# Patient Record
Sex: FEMALE | Race: WHITE | NOT HISPANIC OR LATINO
[De-identification: names, ages, dates, MRNs, and addresses within clinical notes are randomized per-mention and may not be internally consistent; named-entity substitution may affect disease eponyms.]

---

## 2018-06-05 PROBLEM — Z00.00 ENCOUNTER FOR PREVENTIVE HEALTH EXAMINATION: Status: ACTIVE | Noted: 2018-06-05

## 2018-06-18 ENCOUNTER — APPOINTMENT (OUTPATIENT)
Dept: HEART AND VASCULAR | Facility: CLINIC | Age: 68
End: 2018-06-18
Payer: MEDICARE

## 2018-06-18 VITALS
RESPIRATION RATE: 14 BRPM | SYSTOLIC BLOOD PRESSURE: 123 MMHG | HEIGHT: 66 IN | OXYGEN SATURATION: 98 % | DIASTOLIC BLOOD PRESSURE: 80 MMHG | BODY MASS INDEX: 23.14 KG/M2 | WEIGHT: 144 LBS | TEMPERATURE: 97.8 F | HEART RATE: 65 BPM

## 2018-06-18 DIAGNOSIS — Z82.49 FAMILY HISTORY OF ISCHEMIC HEART DISEASE AND OTHER DISEASES OF THE CIRCULATORY SYSTEM: ICD-10-CM

## 2018-06-18 DIAGNOSIS — Z80.0 FAMILY HISTORY OF MALIGNANT NEOPLASM OF DIGESTIVE ORGANS: ICD-10-CM

## 2018-06-18 DIAGNOSIS — Z86.39 PERSONAL HISTORY OF OTHER ENDOCRINE, NUTRITIONAL AND METABOLIC DISEASE: ICD-10-CM

## 2018-06-18 DIAGNOSIS — Z80.3 FAMILY HISTORY OF MALIGNANT NEOPLASM OF BREAST: ICD-10-CM

## 2018-06-18 PROCEDURE — 99204 OFFICE O/P NEW MOD 45 MIN: CPT | Mod: 25

## 2018-06-18 PROCEDURE — 93306 TTE W/DOPPLER COMPLETE: CPT | Mod: XE

## 2018-06-18 PROCEDURE — 93000 ELECTROCARDIOGRAM COMPLETE: CPT

## 2018-09-18 ENCOUNTER — APPOINTMENT (OUTPATIENT)
Dept: HEART AND VASCULAR | Facility: CLINIC | Age: 68
End: 2018-09-18
Payer: MEDICARE

## 2018-09-18 VITALS
HEART RATE: 70 BPM | SYSTOLIC BLOOD PRESSURE: 125 MMHG | WEIGHT: 144 LBS | BODY MASS INDEX: 23.99 KG/M2 | DIASTOLIC BLOOD PRESSURE: 78 MMHG | RESPIRATION RATE: 14 BRPM | TEMPERATURE: 97.7 F | HEIGHT: 65 IN | OXYGEN SATURATION: 97 %

## 2018-09-18 PROCEDURE — 93224 XTRNL ECG REC UP TO 48 HRS: CPT

## 2018-09-18 PROCEDURE — 93000 ELECTROCARDIOGRAM COMPLETE: CPT | Mod: 59

## 2018-09-18 PROCEDURE — 99214 OFFICE O/P EST MOD 30 MIN: CPT

## 2019-03-01 ENCOUNTER — APPOINTMENT (OUTPATIENT)
Dept: HEART AND VASCULAR | Facility: CLINIC | Age: 69
End: 2019-03-01
Payer: MEDICARE

## 2019-03-01 VITALS
DIASTOLIC BLOOD PRESSURE: 80 MMHG | BODY MASS INDEX: 23.32 KG/M2 | SYSTOLIC BLOOD PRESSURE: 120 MMHG | HEIGHT: 65 IN | HEART RATE: 67 BPM | OXYGEN SATURATION: 100 % | WEIGHT: 140 LBS | RESPIRATION RATE: 14 BRPM

## 2019-03-01 PROCEDURE — 93306 TTE W/DOPPLER COMPLETE: CPT

## 2019-03-01 PROCEDURE — 93000 ELECTROCARDIOGRAM COMPLETE: CPT

## 2019-03-01 PROCEDURE — 99214 OFFICE O/P EST MOD 30 MIN: CPT

## 2019-03-01 NOTE — PHYSICAL EXAM
[General Appearance - Well Developed] : well developed [Normal Appearance] : normal appearance [Well Groomed] : well groomed [General Appearance - Well Nourished] : well nourished [No Deformities] : no deformities [General Appearance - In No Acute Distress] : no acute distress [Normal Conjunctiva] : the conjunctiva exhibited no abnormalities [Eyelids - No Xanthelasma] : the eyelids demonstrated no xanthelasmas [Normal Oral Mucosa] : normal oral mucosa [No Oral Pallor] : no oral pallor [No Oral Cyanosis] : no oral cyanosis [Normal Jugular Venous A Waves Present] : normal jugular venous A waves present [Normal Jugular Venous V Waves Present] : normal jugular venous V waves present [No Jugular Venous Quinteros A Waves] : no jugular venous quinteros A waves [Respiration, Rhythm And Depth] : normal respiratory rhythm and effort [Exaggerated Use Of Accessory Muscles For Inspiration] : no accessory muscle use [Auscultation Breath Sounds / Voice Sounds] : lungs were clear to auscultation bilaterally [Heart Rate And Rhythm] : heart rate and rhythm were normal [Heart Sounds] : normal S1 and S2 [Murmurs] : no murmurs present [Abdomen Soft] : soft [Abdomen Tenderness] : non-tender [Abdomen Mass (___ Cm)] : no abdominal mass palpated [Abnormal Walk] : normal gait [Gait - Sufficient For Exercise Testing] : the gait was sufficient for exercise testing [Nail Clubbing] : no clubbing of the fingernails [Cyanosis, Localized] : no localized cyanosis [Petechial Hemorrhages (___cm)] : no petechial hemorrhages [Skin Color & Pigmentation] : normal skin color and pigmentation [] : no rash [No Venous Stasis] : no venous stasis [Skin Lesions] : no skin lesions [No Skin Ulcers] : no skin ulcer [No Xanthoma] : no  xanthoma was observed [Oriented To Time, Place, And Person] : oriented to person, place, and time [Affect] : the affect was normal [Mood] : the mood was normal [No Anxiety] : not feeling anxious

## 2019-03-01 NOTE — HISTORY OF PRESENT ILLNESS
[FreeTextEntry1] : f/u of PAF\par skipping and racing intermit but nothing sustained\par not lightheaded\par no syncope\par no cp or dyspnea\par functional capacity stable

## 2019-03-01 NOTE — DISCUSSION/SUMMARY
[FreeTextEntry1] : PAF, in NSR currently\par I rec NOAC, she declines for now. understands stroke risk\par will get iwatch to monitor her rhythm

## 2020-04-27 ENCOUNTER — APPOINTMENT (OUTPATIENT)
Dept: HEART AND VASCULAR | Facility: CLINIC | Age: 70
End: 2020-04-27
Payer: MEDICARE

## 2020-04-27 PROCEDURE — 99442: CPT | Mod: CR

## 2020-05-05 ENCOUNTER — APPOINTMENT (OUTPATIENT)
Dept: HEART AND VASCULAR | Facility: CLINIC | Age: 70
End: 2020-05-05
Payer: MEDICARE

## 2020-05-05 VITALS
TEMPERATURE: 98.6 F | OXYGEN SATURATION: 98 % | HEART RATE: 67 BPM | WEIGHT: 140 LBS | SYSTOLIC BLOOD PRESSURE: 140 MMHG | HEIGHT: 65 IN | BODY MASS INDEX: 23.32 KG/M2 | DIASTOLIC BLOOD PRESSURE: 70 MMHG | RESPIRATION RATE: 14 BRPM

## 2020-05-05 PROCEDURE — 93224 XTRNL ECG REC UP TO 48 HRS: CPT

## 2020-05-05 PROCEDURE — 99214 OFFICE O/P EST MOD 30 MIN: CPT

## 2020-05-05 NOTE — PHYSICAL EXAM
[General Appearance - Well Developed] : well developed [Normal Appearance] : normal appearance [Well Groomed] : well groomed [General Appearance - Well Nourished] : well nourished [No Deformities] : no deformities [General Appearance - In No Acute Distress] : no acute distress [Eyelids - No Xanthelasma] : the eyelids demonstrated no xanthelasmas [Normal Conjunctiva] : the conjunctiva exhibited no abnormalities [Normal Oral Mucosa] : normal oral mucosa [No Oral Pallor] : no oral pallor [No Oral Cyanosis] : no oral cyanosis [Normal Jugular Venous A Waves Present] : normal jugular venous A waves present [No Jugular Venous Quinteros A Waves] : no jugular venous quinteros A waves [Normal Jugular Venous V Waves Present] : normal jugular venous V waves present [Exaggerated Use Of Accessory Muscles For Inspiration] : no accessory muscle use [Respiration, Rhythm And Depth] : normal respiratory rhythm and effort [Heart Rate And Rhythm] : heart rate and rhythm were normal [Heart Sounds] : normal S1 and S2 [Auscultation Breath Sounds / Voice Sounds] : lungs were clear to auscultation bilaterally [Murmurs] : no murmurs present [Abdomen Tenderness] : non-tender [Abdomen Soft] : soft [Abnormal Walk] : normal gait [Abdomen Mass (___ Cm)] : no abdominal mass palpated [Gait - Sufficient For Exercise Testing] : the gait was sufficient for exercise testing [Cyanosis, Localized] : no localized cyanosis [Nail Clubbing] : no clubbing of the fingernails [Skin Color & Pigmentation] : normal skin color and pigmentation [Petechial Hemorrhages (___cm)] : no petechial hemorrhages [] : no ischemic changes [No Venous Stasis] : no venous stasis [Skin Lesions] : no skin lesions [No Skin Ulcers] : no skin ulcer [No Xanthoma] : no  xanthoma was observed [Affect] : the affect was normal [Mood] : the mood was normal [Oriented To Time, Place, And Person] : oriented to person, place, and time [No Anxiety] : not feeling anxious

## 2020-05-05 NOTE — HISTORY OF PRESENT ILLNESS
[FreeTextEntry1] : increase episodes palps\par skipping and racing intermit but nothing sustained\par not lightheaded\par no syncope\par no cp or dyspnea\par functional capacity stable\par the beta has helped and shes taking 1/2 tab daily

## 2020-05-05 NOTE — DISCUSSION/SUMMARY
[FreeTextEntry1] : likely pvc's\par but also hx af- \par 24h monitor placed\par will get home ekg monitor as well\par echo in near future

## 2020-08-03 ENCOUNTER — APPOINTMENT (OUTPATIENT)
Dept: HEART AND VASCULAR | Facility: CLINIC | Age: 70
End: 2020-08-03
Payer: MEDICARE

## 2020-08-03 VITALS
HEIGHT: 65 IN | DIASTOLIC BLOOD PRESSURE: 70 MMHG | WEIGHT: 144 LBS | RESPIRATION RATE: 14 BRPM | OXYGEN SATURATION: 98 % | TEMPERATURE: 98.3 F | SYSTOLIC BLOOD PRESSURE: 130 MMHG | HEART RATE: 79 BPM | BODY MASS INDEX: 23.99 KG/M2

## 2020-08-03 PROCEDURE — 93000 ELECTROCARDIOGRAM COMPLETE: CPT

## 2020-08-03 PROCEDURE — 99214 OFFICE O/P EST MOD 30 MIN: CPT

## 2020-08-03 NOTE — DISCUSSION/SUMMARY
[FreeTextEntry1] : holter if any further lightheaded\par [id number] = 2 (age, female) so no AC for now\par referred EP to discuss options (ILR, ablation DOAC, etc)\par echo next week

## 2020-08-03 NOTE — PHYSICAL EXAM
[General Appearance - Well Developed] : well developed [Normal Appearance] : normal appearance [General Appearance - Well Nourished] : well nourished [Well Groomed] : well groomed [No Deformities] : no deformities [General Appearance - In No Acute Distress] : no acute distress [Eyelids - No Xanthelasma] : the eyelids demonstrated no xanthelasmas [Normal Conjunctiva] : the conjunctiva exhibited no abnormalities [No Oral Pallor] : no oral pallor [No Oral Cyanosis] : no oral cyanosis [Normal Oral Mucosa] : normal oral mucosa [No Jugular Venous Quinteros A Waves] : no jugular venous quinteros A waves [Normal Jugular Venous A Waves Present] : normal jugular venous A waves present [Normal Jugular Venous V Waves Present] : normal jugular venous V waves present [Respiration, Rhythm And Depth] : normal respiratory rhythm and effort [Exaggerated Use Of Accessory Muscles For Inspiration] : no accessory muscle use [Auscultation Breath Sounds / Voice Sounds] : lungs were clear to auscultation bilaterally [Murmurs] : no murmurs present [Heart Sounds] : normal S1 and S2 [Heart Rate And Rhythm] : heart rate and rhythm were normal [Abdomen Tenderness] : non-tender [Abdomen Soft] : soft [Abnormal Walk] : normal gait [Gait - Sufficient For Exercise Testing] : the gait was sufficient for exercise testing [Nail Clubbing] : no clubbing of the fingernails [Abdomen Mass (___ Cm)] : no abdominal mass palpated [Petechial Hemorrhages (___cm)] : no petechial hemorrhages [Cyanosis, Localized] : no localized cyanosis [] : no rash [No Venous Stasis] : no venous stasis [Skin Color & Pigmentation] : normal skin color and pigmentation [Skin Lesions] : no skin lesions [No Xanthoma] : no  xanthoma was observed [No Skin Ulcers] : no skin ulcer [Mood] : the mood was normal [Affect] : the affect was normal [Oriented To Time, Place, And Person] : oriented to person, place, and time [No Anxiety] : not feeling anxious

## 2020-08-03 NOTE — HISTORY OF PRESENT ILLNESS
[FreeTextEntry1] : increase episodes palps\par skipping and racing intermit but nothing sustained\par no syncope\par no cp or dyspnea\par functional capacity stable\par she documented AF on her i watch - strip in chart\par had transient lightheadedness this weekend but HR at time in 60's

## 2020-08-10 ENCOUNTER — APPOINTMENT (OUTPATIENT)
Dept: HEART AND VASCULAR | Facility: CLINIC | Age: 70
End: 2020-08-10
Payer: MEDICARE

## 2020-08-10 VITALS
BODY MASS INDEX: 23.96 KG/M2 | WEIGHT: 144 LBS | OXYGEN SATURATION: 97 % | SYSTOLIC BLOOD PRESSURE: 130 MMHG | DIASTOLIC BLOOD PRESSURE: 60 MMHG | HEART RATE: 71 BPM | TEMPERATURE: 98 F

## 2020-08-10 PROCEDURE — 93306 TTE W/DOPPLER COMPLETE: CPT

## 2020-08-10 PROCEDURE — 99213 OFFICE O/P EST LOW 20 MIN: CPT

## 2020-08-10 NOTE — PHYSICAL EXAM
[Normal Appearance] : normal appearance [General Appearance - Well Developed] : well developed [Well Groomed] : well groomed [General Appearance - Well Nourished] : well nourished [No Deformities] : no deformities [General Appearance - In No Acute Distress] : no acute distress [Normal Conjunctiva] : the conjunctiva exhibited no abnormalities [Eyelids - No Xanthelasma] : the eyelids demonstrated no xanthelasmas [Normal Oral Mucosa] : normal oral mucosa [Normal Jugular Venous A Waves Present] : normal jugular venous A waves present [No Oral Pallor] : no oral pallor [No Oral Cyanosis] : no oral cyanosis [Normal Jugular Venous V Waves Present] : normal jugular venous V waves present [No Jugular Venous Quinteros A Waves] : no jugular venous quinteros A waves [Exaggerated Use Of Accessory Muscles For Inspiration] : no accessory muscle use [Respiration, Rhythm And Depth] : normal respiratory rhythm and effort [Heart Rate And Rhythm] : heart rate and rhythm were normal [Auscultation Breath Sounds / Voice Sounds] : lungs were clear to auscultation bilaterally [Heart Sounds] : normal S1 and S2 [Abdomen Tenderness] : non-tender [Murmurs] : no murmurs present [Abdomen Soft] : soft [Abdomen Mass (___ Cm)] : no abdominal mass palpated [Abnormal Walk] : normal gait [Gait - Sufficient For Exercise Testing] : the gait was sufficient for exercise testing [Cyanosis, Localized] : no localized cyanosis [Nail Clubbing] : no clubbing of the fingernails [Skin Color & Pigmentation] : normal skin color and pigmentation [Petechial Hemorrhages (___cm)] : no petechial hemorrhages [Skin Lesions] : no skin lesions [] : no rash [No Venous Stasis] : no venous stasis [No Skin Ulcers] : no skin ulcer [Oriented To Time, Place, And Person] : oriented to person, place, and time [No Xanthoma] : no  xanthoma was observed [Mood] : the mood was normal [Affect] : the affect was normal [No Anxiety] : not feeling anxious

## 2020-08-11 ENCOUNTER — TRANSCRIPTION ENCOUNTER (OUTPATIENT)
Age: 70
End: 2020-08-11

## 2020-09-02 ENCOUNTER — APPOINTMENT (OUTPATIENT)
Dept: HEART AND VASCULAR | Facility: CLINIC | Age: 70
End: 2020-09-02
Payer: MEDICARE

## 2020-09-02 ENCOUNTER — NON-APPOINTMENT (OUTPATIENT)
Age: 70
End: 2020-09-02

## 2020-09-02 VITALS
HEIGHT: 65 IN | HEART RATE: 64 BPM | WEIGHT: 144 LBS | DIASTOLIC BLOOD PRESSURE: 63 MMHG | OXYGEN SATURATION: 99 % | BODY MASS INDEX: 23.99 KG/M2 | SYSTOLIC BLOOD PRESSURE: 135 MMHG | TEMPERATURE: 99 F

## 2020-09-02 PROCEDURE — 93000 ELECTROCARDIOGRAM COMPLETE: CPT

## 2020-09-02 PROCEDURE — 99205 OFFICE O/P NEW HI 60 MIN: CPT

## 2020-09-09 NOTE — REVIEW OF SYSTEMS
[see HPI] : see HPI [Negative] : Heme/Lymph [Chills] : no chills [Feeling Fatigued] : not feeling fatigued [Fever] : no fever

## 2020-09-09 NOTE — DISCUSSION/SUMMARY
[FreeTextEntry1] : 70 year old pleasant female with documented atrial flutter and likely atrial fibrillation, who presents for initial evaluation.  We discussed what atrial flutter and atrial fibrillation are in great detail.  We discussed the association between these two arrhythmias and their natural progression.  We also discussed their association with stroke.  Her CHADS score is 2 (age, female) and ideally she should be on a NOAC.  I have told her she can stop aspirin and have encouraged her to consider starting a NOAC.  She is currently describing episodes of palpitations that last seconds and do not interfere with her daily life.  We discussed she could be having longer episodes that she does not feel.  She would consider starting a NOAC if she was having longer episodes and  I have offered her a loop recorder or long term event monitor.  SHe is amenable to a long term monitor and we placed one in our office today.  We discussed long term treatment options including observation/rate control, antiarrhythmic medications or an ablation.  She currently would like to continue with observation as her symptoms are minimal.  She will follow up after she returns her event monitor or sooner if needed and knows to call with any questions or concenrs.

## 2020-09-09 NOTE — PHYSICAL EXAM
[General Appearance - Well Developed] : well developed [Normal Appearance] : normal appearance [General Appearance - Well Nourished] : well nourished [Well Groomed] : well groomed [No Deformities] : no deformities [General Appearance - In No Acute Distress] : no acute distress [Normal Conjunctiva] : the conjunctiva exhibited no abnormalities [Normal Jugular Venous V Waves Present] : normal jugular venous V waves present [5th Left ICS - MCL] : palpated at the 5th LICS in the midclavicular line [Normal Oral Mucosa] : normal oral mucosa [No Precordial Heave] : no precordial heave was noted [Normal] : normal [Normal Rate] : normal [Rhythm Regular] : regular [Normal S1] : normal S1 [Normal S2] : normal S2 [No Pitting Edema] : no pitting edema present [Respiration, Rhythm And Depth] : normal respiratory rhythm and effort [] : no respiratory distress [Exaggerated Use Of Accessory Muscles For Inspiration] : no accessory muscle use [Abnormal Walk] : normal gait [Skin Color & Pigmentation] : normal skin color and pigmentation [Cyanosis, Localized] : no localized cyanosis [Affect] : the affect was normal [Impaired Insight] : insight and judgment were intact [Oriented To Time, Place, And Person] : oriented to person, place, and time [Mood] : the mood was normal [No Anxiety] : not feeling anxious [Apical Thrill] : no thrill palpable at the apex

## 2020-09-09 NOTE — HISTORY OF PRESENT ILLNESS
[FreeTextEntry1] : 70 year old pleasant female with documented atrial flutter and likely atrial fibrillation, who presents for initial evaluation.\par \par She states in 2011 she wore an event monitor that showed atrial flutter.  She was referred to an electrophysiologist and given her lack of significant symptoms the plan was made to proceed with observation.  She states she had no further palpitations until the past few months when she notes intermittent palpitations lasting seconds but occurring daily.  She states it mostly happens at night and after a few seconds she checks her pulse and it is regular.  She has an apple watch with a strip that looks like likely atrial fibrillation.   No chest pain, SOB, syncope, near syncope, orthopnea.  She is not on oral anticoagulation.  \par  \par

## 2020-09-15 ENCOUNTER — APPOINTMENT (OUTPATIENT)
Dept: HEART AND VASCULAR | Facility: CLINIC | Age: 70
End: 2020-09-15
Payer: MEDICARE

## 2020-09-15 PROCEDURE — 0298T: CPT

## 2020-10-15 ENCOUNTER — RX RENEWAL (OUTPATIENT)
Age: 70
End: 2020-10-15

## 2021-04-07 ENCOUNTER — RX RENEWAL (OUTPATIENT)
Age: 71
End: 2021-04-07

## 2021-04-28 ENCOUNTER — APPOINTMENT (OUTPATIENT)
Dept: HEART AND VASCULAR | Facility: CLINIC | Age: 71
End: 2021-04-28
Payer: MEDICARE

## 2021-04-28 VITALS
DIASTOLIC BLOOD PRESSURE: 60 MMHG | TEMPERATURE: 98.6 F | SYSTOLIC BLOOD PRESSURE: 130 MMHG | HEART RATE: 77 BPM | RESPIRATION RATE: 14 BRPM | WEIGHT: 145 LBS | OXYGEN SATURATION: 98 % | HEIGHT: 65 IN | BODY MASS INDEX: 24.16 KG/M2

## 2021-04-28 PROCEDURE — 93000 ELECTROCARDIOGRAM COMPLETE: CPT

## 2021-04-28 PROCEDURE — 99214 OFFICE O/P EST MOD 30 MIN: CPT

## 2021-04-28 NOTE — HISTORY OF PRESENT ILLNESS
[FreeTextEntry1] : here with lightheadedness for few weeks\par off and on\par no precipitating factors- happens when walking and also in a chair\par no syncope\par has felt her pulse during an episode and it feels regular\par no cp or dyspnea\par functional capacity stable\par

## 2021-06-25 ENCOUNTER — APPOINTMENT (OUTPATIENT)
Dept: HEART AND VASCULAR | Facility: CLINIC | Age: 71
End: 2021-06-25

## 2021-07-14 ENCOUNTER — APPOINTMENT (OUTPATIENT)
Dept: HEART AND VASCULAR | Facility: CLINIC | Age: 71
End: 2021-07-14
Payer: MEDICARE

## 2021-07-14 VITALS
BODY MASS INDEX: 24.83 KG/M2 | WEIGHT: 149 LBS | RESPIRATION RATE: 14 BRPM | HEIGHT: 65 IN | TEMPERATURE: 97.7 F | HEART RATE: 68 BPM | OXYGEN SATURATION: 96 % | DIASTOLIC BLOOD PRESSURE: 70 MMHG | SYSTOLIC BLOOD PRESSURE: 110 MMHG

## 2021-07-14 DIAGNOSIS — R00.2 PALPITATIONS: ICD-10-CM

## 2021-07-14 PROCEDURE — 99214 OFFICE O/P EST MOD 30 MIN: CPT

## 2021-07-14 RX ORDER — ROSUVASTATIN CALCIUM 5 MG/1
5 TABLET, FILM COATED ORAL
Qty: 30 | Refills: 1 | Status: ACTIVE | COMMUNITY
Start: 2021-07-14

## 2021-07-14 NOTE — HISTORY OF PRESENT ILLNESS
[FreeTextEntry1] : f/u PAF\par no further lightheadedness\par no syncope\par no cp or dyspnea\par functional capacity stable\par still gets daily PAF\par

## 2021-07-14 NOTE — DISCUSSION/SUMMARY
[FreeTextEntry1] : PAF, Chadsv2=2\par I rec DOAC, eliquis. she declines for now\par f/u in May for echo and holter

## 2022-01-31 ENCOUNTER — APPOINTMENT (OUTPATIENT)
Dept: HEART AND VASCULAR | Facility: CLINIC | Age: 72
End: 2022-01-31
Payer: MEDICARE

## 2022-01-31 VITALS
HEIGHT: 65 IN | SYSTOLIC BLOOD PRESSURE: 118 MMHG | BODY MASS INDEX: 24.66 KG/M2 | WEIGHT: 148 LBS | DIASTOLIC BLOOD PRESSURE: 70 MMHG | HEART RATE: 70 BPM | TEMPERATURE: 98.6 F | OXYGEN SATURATION: 97 %

## 2022-01-31 DIAGNOSIS — E78.2 MIXED HYPERLIPIDEMIA: ICD-10-CM

## 2022-01-31 PROCEDURE — 99214 OFFICE O/P EST MOD 30 MIN: CPT

## 2022-01-31 PROCEDURE — 93000 ELECTROCARDIOGRAM COMPLETE: CPT

## 2022-01-31 NOTE — HISTORY OF PRESENT ILLNESS
[FreeTextEntry1] : f/u PAF\par no further lightheadedness\par no syncope\par no cp or dyspnea\par no hx of bleeding\par

## 2022-01-31 NOTE — DISCUSSION/SUMMARY
[FreeTextEntry1] : PAF, Chadsvasc of 2 (but age > 65 is one of the points)\par I rec again DOAC\par she agrees\par will see me back in 3mo or sooner PRN

## 2022-03-08 RX ORDER — APIXABAN 5 MG/1
5 TABLET, FILM COATED ORAL
Qty: 60 | Refills: 3 | Status: ACTIVE | COMMUNITY
Start: 2022-01-31 | End: 1900-01-01

## 2022-04-25 ENCOUNTER — APPOINTMENT (OUTPATIENT)
Dept: HEART AND VASCULAR | Facility: CLINIC | Age: 72
End: 2022-04-25
Payer: MEDICARE

## 2022-04-25 VITALS
BODY MASS INDEX: 24.66 KG/M2 | TEMPERATURE: 98 F | HEIGHT: 65 IN | WEIGHT: 148 LBS | HEART RATE: 74 BPM | DIASTOLIC BLOOD PRESSURE: 70 MMHG | RESPIRATION RATE: 14 BRPM | SYSTOLIC BLOOD PRESSURE: 120 MMHG | OXYGEN SATURATION: 97 %

## 2022-04-25 PROCEDURE — 99213 OFFICE O/P EST LOW 20 MIN: CPT

## 2022-04-25 RX ORDER — DIAZEPAM 2 MG/1
2 TABLET ORAL
Qty: 30 | Refills: 0 | Status: ACTIVE | COMMUNITY
Start: 2021-11-03

## 2022-04-25 NOTE — HISTORY OF PRESENT ILLNESS
[FreeTextEntry1] : f/u PAF\par no further lightheadedness\par no syncope\par no cp or dyspnea\par tolerating doac, no bleeding\par

## 2022-04-26 LAB
ANION GAP SERPL CALC-SCNC: 13 MMOL/L
BASOPHILS # BLD AUTO: 0.03 K/UL
BASOPHILS NFR BLD AUTO: 0.4 %
BUN SERPL-MCNC: 15 MG/DL
CALCIUM SERPL-MCNC: 9.6 MG/DL
CHLORIDE SERPL-SCNC: 103 MMOL/L
CO2 SERPL-SCNC: 24 MMOL/L
CREAT SERPL-MCNC: 0.73 MG/DL
EGFR: 88 ML/MIN/1.73M2
EOSINOPHIL # BLD AUTO: 0.37 K/UL
EOSINOPHIL NFR BLD AUTO: 5.2 %
GLUCOSE SERPL-MCNC: 96 MG/DL
HCT VFR BLD CALC: 43.5 %
HGB BLD-MCNC: 13.5 G/DL
IMM GRANULOCYTES NFR BLD AUTO: 0.1 %
LYMPHOCYTES # BLD AUTO: 1.82 K/UL
LYMPHOCYTES NFR BLD AUTO: 25.7 %
MAN DIFF?: NORMAL
MCHC RBC-ENTMCNC: 30.1 PG
MCHC RBC-ENTMCNC: 31 GM/DL
MCV RBC AUTO: 96.9 FL
MONOCYTES # BLD AUTO: 0.54 K/UL
MONOCYTES NFR BLD AUTO: 7.6 %
NEUTROPHILS # BLD AUTO: 4.3 K/UL
NEUTROPHILS NFR BLD AUTO: 61 %
PLATELET # BLD AUTO: 193 K/UL
POTASSIUM SERPL-SCNC: 4.7 MMOL/L
RBC # BLD: 4.49 M/UL
RBC # FLD: 13.7 %
SODIUM SERPL-SCNC: 141 MMOL/L
WBC # FLD AUTO: 7.07 K/UL

## 2022-04-27 ENCOUNTER — RX RENEWAL (OUTPATIENT)
Age: 72
End: 2022-04-27

## 2022-04-27 RX ORDER — METOPROLOL SUCCINATE 25 MG/1
25 TABLET, EXTENDED RELEASE ORAL
Qty: 90 | Refills: 1 | Status: ACTIVE | COMMUNITY
Start: 2020-04-27 | End: 1900-01-01

## 2022-05-26 ENCOUNTER — INPATIENT (INPATIENT)
Facility: HOSPITAL | Age: 72
LOS: 1 days | Discharge: HOME | DRG: 948 | End: 2022-05-28
Attending: INTERNAL MEDICINE | Admitting: INTERNAL MEDICINE
Payer: MEDICARE

## 2022-05-26 VITALS
DIASTOLIC BLOOD PRESSURE: 85 MMHG | RESPIRATION RATE: 16 BRPM | SYSTOLIC BLOOD PRESSURE: 133 MMHG | HEART RATE: 85 BPM | TEMPERATURE: 99 F | OXYGEN SATURATION: 98 % | WEIGHT: 147.93 LBS

## 2022-05-26 LAB
ALBUMIN SERPL ELPH-MCNC: 4.4 G/DL — SIGNIFICANT CHANGE UP (ref 3.3–5)
ALP SERPL-CCNC: 85 U/L — SIGNIFICANT CHANGE UP (ref 40–120)
ALT FLD-CCNC: 32 U/L — SIGNIFICANT CHANGE UP (ref 10–45)
ANION GAP SERPL CALC-SCNC: 13 MMOL/L — SIGNIFICANT CHANGE UP (ref 5–17)
APPEARANCE UR: CLEAR — SIGNIFICANT CHANGE UP
APTT BLD: 39.5 SEC — HIGH (ref 27.5–35.5)
AST SERPL-CCNC: 34 U/L — SIGNIFICANT CHANGE UP (ref 10–40)
BACTERIA # UR AUTO: PRESENT /HPF
BASOPHILS # BLD AUTO: 0.04 K/UL — SIGNIFICANT CHANGE UP (ref 0–0.2)
BASOPHILS NFR BLD AUTO: 0.3 % — SIGNIFICANT CHANGE UP (ref 0–2)
BILIRUB SERPL-MCNC: 0.3 MG/DL — SIGNIFICANT CHANGE UP (ref 0.2–1.2)
BILIRUB UR-MCNC: NEGATIVE — SIGNIFICANT CHANGE UP
BUN SERPL-MCNC: 15 MG/DL — SIGNIFICANT CHANGE UP (ref 7–23)
CALCIUM SERPL-MCNC: 9.8 MG/DL — SIGNIFICANT CHANGE UP (ref 8.4–10.5)
CHLORIDE SERPL-SCNC: 103 MMOL/L — SIGNIFICANT CHANGE UP (ref 96–108)
CK MB CFR SERPL CALC: 2.9 NG/ML — SIGNIFICANT CHANGE UP (ref 0–6.7)
CK SERPL-CCNC: 101 U/L — SIGNIFICANT CHANGE UP (ref 25–170)
CO2 SERPL-SCNC: 23 MMOL/L — SIGNIFICANT CHANGE UP (ref 22–31)
COLOR SPEC: YELLOW — SIGNIFICANT CHANGE UP
CREAT SERPL-MCNC: 0.77 MG/DL — SIGNIFICANT CHANGE UP (ref 0.5–1.3)
DIFF PNL FLD: NEGATIVE — SIGNIFICANT CHANGE UP
EGFR: 83 ML/MIN/1.73M2 — SIGNIFICANT CHANGE UP
EOSINOPHIL # BLD AUTO: 0.23 K/UL — SIGNIFICANT CHANGE UP (ref 0–0.5)
EOSINOPHIL NFR BLD AUTO: 1.7 % — SIGNIFICANT CHANGE UP (ref 0–6)
GLUCOSE SERPL-MCNC: 114 MG/DL — HIGH (ref 70–99)
GLUCOSE UR QL: NEGATIVE — SIGNIFICANT CHANGE UP
HCT VFR BLD CALC: 41.3 % — SIGNIFICANT CHANGE UP (ref 34.5–45)
HGB BLD-MCNC: 13.5 G/DL — SIGNIFICANT CHANGE UP (ref 11.5–15.5)
IMM GRANULOCYTES NFR BLD AUTO: 0.4 % — SIGNIFICANT CHANGE UP (ref 0–1.5)
INR BLD: 1.2 — HIGH (ref 0.88–1.16)
KETONES UR-MCNC: NEGATIVE — SIGNIFICANT CHANGE UP
LEUKOCYTE ESTERASE UR-ACNC: ABNORMAL
LYMPHOCYTES # BLD AUTO: 1.57 K/UL — SIGNIFICANT CHANGE UP (ref 1–3.3)
LYMPHOCYTES # BLD AUTO: 11.5 % — LOW (ref 13–44)
MCHC RBC-ENTMCNC: 30.5 PG — SIGNIFICANT CHANGE UP (ref 27–34)
MCHC RBC-ENTMCNC: 32.7 GM/DL — SIGNIFICANT CHANGE UP (ref 32–36)
MCV RBC AUTO: 93.4 FL — SIGNIFICANT CHANGE UP (ref 80–100)
MONOCYTES # BLD AUTO: 0.73 K/UL — SIGNIFICANT CHANGE UP (ref 0–0.9)
MONOCYTES NFR BLD AUTO: 5.4 % — SIGNIFICANT CHANGE UP (ref 2–14)
NEUTROPHILS # BLD AUTO: 11 K/UL — HIGH (ref 1.8–7.4)
NEUTROPHILS NFR BLD AUTO: 80.7 % — HIGH (ref 43–77)
NITRITE UR-MCNC: NEGATIVE — SIGNIFICANT CHANGE UP
NRBC # BLD: 0 /100 WBCS — SIGNIFICANT CHANGE UP (ref 0–0)
PH UR: 6 — SIGNIFICANT CHANGE UP (ref 5–8)
PLATELET # BLD AUTO: 209 K/UL — SIGNIFICANT CHANGE UP (ref 150–400)
POTASSIUM SERPL-MCNC: 4.7 MMOL/L — SIGNIFICANT CHANGE UP (ref 3.5–5.3)
POTASSIUM SERPL-SCNC: 4.7 MMOL/L — SIGNIFICANT CHANGE UP (ref 3.5–5.3)
PROT SERPL-MCNC: 7.3 G/DL — SIGNIFICANT CHANGE UP (ref 6–8.3)
PROT UR-MCNC: NEGATIVE MG/DL — SIGNIFICANT CHANGE UP
PROTHROM AB SERPL-ACNC: 14.3 SEC — HIGH (ref 10.5–13.4)
RBC # BLD: 4.42 M/UL — SIGNIFICANT CHANGE UP (ref 3.8–5.2)
RBC # FLD: 13.1 % — SIGNIFICANT CHANGE UP (ref 10.3–14.5)
RBC CASTS # UR COMP ASSIST: < 5 /HPF — SIGNIFICANT CHANGE UP
SARS-COV-2 RNA SPEC QL NAA+PROBE: NEGATIVE — SIGNIFICANT CHANGE UP
SODIUM SERPL-SCNC: 139 MMOL/L — SIGNIFICANT CHANGE UP (ref 135–145)
SP GR SPEC: <=1.005 — SIGNIFICANT CHANGE UP (ref 1–1.03)
TROPONIN T SERPL-MCNC: 0.01 NG/ML — SIGNIFICANT CHANGE UP (ref 0–0.01)
UROBILINOGEN FLD QL: 0.2 E.U./DL — SIGNIFICANT CHANGE UP
WBC # BLD: 13.62 K/UL — HIGH (ref 3.8–10.5)
WBC # FLD AUTO: 13.62 K/UL — HIGH (ref 3.8–10.5)
WBC UR QL: ABNORMAL /HPF

## 2022-05-26 PROCEDURE — 99291 CRITICAL CARE FIRST HOUR: CPT

## 2022-05-26 PROCEDURE — 93010 ELECTROCARDIOGRAM REPORT: CPT

## 2022-05-26 PROCEDURE — 71045 X-RAY EXAM CHEST 1 VIEW: CPT | Mod: 26

## 2022-05-26 PROCEDURE — 70450 CT HEAD/BRAIN W/O DYE: CPT | Mod: 26,MA

## 2022-05-26 RX ORDER — APIXABAN 2.5 MG/1
5 TABLET, FILM COATED ORAL EVERY 12 HOURS
Refills: 0 | Status: DISCONTINUED | OUTPATIENT
Start: 2022-05-27 | End: 2022-05-28

## 2022-05-26 RX ORDER — ROSUVASTATIN CALCIUM 5 MG/1
1 TABLET ORAL
Qty: 0 | Refills: 0 | DISCHARGE

## 2022-05-26 RX ORDER — APIXABAN 2.5 MG/1
1 TABLET, FILM COATED ORAL
Qty: 0 | Refills: 0 | DISCHARGE

## 2022-05-26 RX ORDER — APIXABAN 2.5 MG/1
5 TABLET, FILM COATED ORAL ONCE
Refills: 0 | Status: COMPLETED | OUTPATIENT
Start: 2022-05-26 | End: 2022-05-26

## 2022-05-26 RX ADMIN — APIXABAN 5 MILLIGRAM(S): 2.5 TABLET, FILM COATED ORAL at 22:12

## 2022-05-26 NOTE — H&P ADULT - ASSESSMENT
ASSESSMENT/PLAN:    72y Female w/ PMH of Afib (on eliquis and Metoprolol), HLD on crestor P/W acute confusion/ forgetfulness since 4:30pm on 05/26. Stroke code was called upon arrival to [facility] ER with initial CTH negative for any acute intracranial pathology. Initial NIHSS 0. Non focal exam. CTA H/N deferred 2/2 low suspicion for LVO and due to national contrast shortage. Pt admitted to Stroke unit for further work up.    Neuro  #CVA workup  - C/W Eliquis 5mg BID  - C/W Crestor 10mg Po daily (per pt cannot take atorva 2/2 hair loss).  - q4hr stroke neuro checks and vitals  - obtain MRI Brain without contrast  - Stroke Code HCT Results: Negative   - Stroke education    Cards  #HTN  - permissive hypertension, Goal -180  - hold home blood pressure medication for now  - Started on Metoprolol tartrate 12.5mg PO BID for rate control and to avoid reflex tachycardia(pt with pAF and stated that she felt her irregular rhythm multiple times on the day of admission).  - obtain TTE with bubble  - Stroke Code EKG Results: F/U official read.    #HLD  - C/W home dose statin as above in CVA  - LDL results:  pending    Pulm  - call provider if SPO2 < 94%    GI  #Nutrition/Fluids/Electrolytes   - replete K<4 and Mg <2  - Diet: DASH/TLC  - IVF: None for now.    Renal  - C/T trend Bun/Crt    Infectious Disease  - Stroke Code CXR results: F/U official read.    Endocrine  - A1C results: pending  - TSH results: Pending    DVT Prophylaxis  - On Eliquis  - SCDs for DVT prophylaxis     Dispo: Pending PT/OT eval     Discussed daily hospital plans and goals with patient at bedside.     Discussed with Neurology Attending Dr. White

## 2022-05-26 NOTE — ED PROVIDER NOTE - PHYSICAL EXAMINATION
CONSTITUTIONAL: Well-appearing; in no apparent distress.   HEAD: Normocephalic; atraumatic.   EYES:  conjunctiva and sclera clear  ENT: normal nose; no rhinorrhea; normal pharynx with no erythema or lesions.   NECK: Supple; non-tender;   CARDIOVASCULAR: Normal S1, S2; no murmurs, rubs, or gallops. Regular rate and rhythm.   RESPIRATORY: Breathing easily; breath sounds clear and equal bilaterally; no wheezes, rhonchi, or rales.  GI: Soft; non-distended; non-tender; no palpable organomegaly.   EXT: No cyanosis or edema; N/V intact  SKIN: Normal for age and race; warm; dry; good turgor; no apparent lesions or rash.   NEURO: A & O x 4; face symmetric; grossly unremarkable. Neuro exam refer to stroke NP at bedside. Pt has no facial droop, no protonate drift, stable gate, clear speech.   PSYCHOLOGICAL: The patient’s mood and manner are appropriate.

## 2022-05-26 NOTE — ED PROVIDER NOTE - CRITICAL CARE ATTENDING CONTRIBUTION TO CARE
Upon my evaluation, this patient had a high probability of imminent or life-threatening deterioration due to acute confusion concerning for stroke which required my direct attention, intervention, and personal management.    I have personally provided critical care time exclusive of time spent on separately billable procedures. Time includes review of laboratory data, radiology results, discussion with consultants, and monitoring for potential decompensation. Interventions were performed as documented above.

## 2022-05-26 NOTE — H&P ADULT - NSHPLABSRESULTS_GEN_ALL_CORE
Vital Signs Last 24 Hrs  T(C): 36.9 (26 May 2022 21:27), Max: 37.2 (26 May 2022 19:05)  T(F): 98.5 (26 May 2022 21:27), Max: 99 (26 May 2022 19:05)  HR: 76 (26 May 2022 21:27) (70 - 85)  BP: 134/64 (26 May 2022 21:27) (133/85 - 177/81)  BP(mean): --  RR: 18 (26 May 2022 21:27) (16 - 18)  SpO2: 99% (26 May 2022 21:27) (97% - 100%)    POCT Blood Glucose.: 100 mg/dL (26 May 2022 19:08)    LABS:                        13.5   13.62 )-----------( 209      ( 26 May 2022 19:22 )             41.3     05-26    139  |  103  |  15  ----------------------------<  114<H>  4.7   |  23  |  0.77    Ca    9.8      26 May 2022 19:22    TPro  7.3  /  Alb  4.4  /  TBili  0.3  /  DBili  x   /  AST  34  /  ALT  32  /  AlkPhos  85  05-26    PT/INR - ( 26 May 2022 19:22 )   PT: 14.3 sec;   INR: 1.20          PTT - ( 26 May 2022 19:22 )  PTT:39.5 sec  CARDIAC MARKERS ( 26 May 2022 19:22 )  x     / 0.01 ng/mL / 101 U/L / x     / 2.9 ng/mL      Urinalysis Basic - ( 26 May 2022 20:25 )    Color: Yellow / Appearance: Clear / SG: <=1.005 / pH: x  Gluc: x / Ketone: NEGATIVE  / Bili: Negative / Urobili: 0.2 E.U./dL   Blood: x / Protein: NEGATIVE mg/dL / Nitrite: NEGATIVE   Leuk Esterase: Small / RBC: < 5 /HPF / WBC 5-10 /HPF   Sq Epi: x / Non Sq Epi: x / Bacteria: Present /HPF    RADIOLOGY & ADDITIONAL STUDIES:    CT Brain Stroke Protocol (05.26.22 @ 19:18)     IMPRESSION:  No acute intracranial hemorrhage or transcortical infarction.

## 2022-05-26 NOTE — H&P ADULT - NSHPREVIEWOFSYSTEMS_GEN_ALL_CORE
ROS:   Constitutional: No fever, weight loss or fatigue  Eyes: No eye pain, visual disturbances, or discharge  ENMT:  No difficulty hearing, tinnitus, vertigo; No sinus or throat pain  Neck: No pain or stiffness  Respiratory: No cough, wheezing, chills or hemoptysis  Cardiovascular: No chest pain, palpitations, shortness of breath, dizziness or leg swelling  Gastrointestinal: No abdominal pain. No nausea, vomiting or hematemesis; No diarrhea or constipation.   Genitourinary: No dysuria, frequency, hematuria or incontinence  Neurological: As per HPI  Skin: No itching, burning, rashes or lesions   Musculoskeletal: No joint pain or swelling; No muscle, back or extremity pain

## 2022-05-26 NOTE — ED ADULT NURSE REASSESSMENT NOTE - NS ED NURSE REASSESS COMMENT FT1
Patient remains in ED on cardiac monitor. Seen and evaluated by stroke team. Denies any dizziness at present time. Will continue to monitor.
Pt denies chest pain, headache, dizziness, n&v, lightheadedness and sob. Pt hooked to cont cardiac monitor and spo2. Will continue to monitor.
Pt received from day shift. Pt is awake and alert and stable VS. Per pt "I just have some difficulty with some words". Pt is A&Ox4.

## 2022-05-26 NOTE — ED PROVIDER NOTE - CLINICAL SUMMARY MEDICAL DECISION MAKING FREE TEXT BOX
Pt is a 71 y/o F c/o word finding difficulties for the past 2 hours. Pt here with acute onset of confusion and is improving. Pt is on Eliquis and hence is not a TPA candidate. Symptoms very mild doubt LVO, given national IV contrast shortage CT angio not done. Given persistent symptoms although improving pt still feels not back to her baseline and plan for admission to stroke unit for rule out.

## 2022-05-26 NOTE — ED ADULT TRIAGE NOTE - CHIEF COMPLAINT QUOTE
pt states "I'm having trouble answering simple questions for the last two hours that I know I know the answer to" pt denies head ache blurry vision changes in vision no dizziness or difficulty walking Pt reports getting covid booster today

## 2022-05-26 NOTE — ED ADULT NURSE NOTE - OBJECTIVE STATEMENT
72yr/female presents to ED with c/o confusion today that started around 5pm. Patient reports "I was talking to my doctor and he asked who the president was and I couldn't answer." Patient denies headache, dizziness, nausea, numbness or weakness. No facial drooping, slurred speech or arm drift noted. Patient ambulating with steady gait. Patient reports she received COVID vaccine booster earlier today. Upgraded to Dr. Dominguez, stroke code called. 72yr/female presents to ED with c/o confusion today that started around 5pm. Patient reports "I was talking to my doctor and he asked who the president was and I couldn't answer." Patient denies headache, dizziness, vision changes, nausea, numbness or weakness. No facial drooping, slurred speech or arm drift noted. Patient ambulating with steady gait. Patient reports she received COVID vaccine booster earlier today. Upgraded to Dr. Dominguez, stroke code called.

## 2022-05-26 NOTE — H&P ADULT - HISTORY OF PRESENT ILLNESS
STROKE HPI    HPI: 72y Female with PMHx of Afib (on Eliquis and Metoprolol), HLD on Crestor P/W acute confusion/ forgetfulness since 4:30pm on 05/26. Per pt, was in [facility] this am, had her COVID booster vaccine @ 10:30am and felt fine post vaccination. Later in the evening around 4:30pm, felt confused, forgetful which she describes as " slow thinking" or not thinking clearly. Pt states she was unsure of the day of the week, unable to name the president when asked by PCP over the phone which made her seek medical attention. Pt feels this is not her normal self. Denies any HA/vision or hearing changes/dizziness/unilateral weakness. Stroke code was called upon arrival to [facility] ER. Pt taken to CT scanner and was evaluated while in the table. Non focal exam with initial NIHSS 0. CTH negative for any acute intracranial pathology. CTA H/N deferred 2/2 low suspicion for LVO and also due to national contrast shortage. Case D/W Dr. White and pt not a candidate for any intervention tPA/EVT.    PAST MEDICAL & SURGICAL HISTORY:      FAMILY HISTORY:                MEDICATION RECONCILIATION   MEDICATIONS  (STANDING):    MEDICATIONS  (PRN):    Allergies    sulfa drugs (Hives)    Intolerances      Vital Signs Last 24 Hrs  T(C): 36.9 (26 May 2022 21:27), Max: 37.2 (26 May 2022 19:05)  T(F): 98.5 (26 May 2022 21:27), Max: 99 (26 May 2022 19:05)  HR: 76 (26 May 2022 21:27) (70 - 85)  BP: 134/64 (26 May 2022 21:27) (133/85 - 177/81)  BP(mean): --  RR: 18 (26 May 2022 21:27) (16 - 18)  SpO2: 99% (26 May 2022 21:27) (97% - 100%)    Physical exam:  General: No acute distress, awake and alert  Cardiovascular: Regular rate and rhythm, no murmurs, rubs, or gallops. No bruits  Pulmonary: Anterior breath sounds clear bilaterally, no crackles or wheezing. No use of accessory muscles  GI: Abdomen soft, non-tender, non-distended    Neurologic:  -Mental status: Awake, alert, oriented to person, place, and time. Speech is fluent with intact naming, repetition, and comprehension, no dysarthria. Recent and remote memory intact. Follows commands. Attention/concentration intact. Fund of knowledge appropriate.  -Cranial nerves:   II: Visual fields are full to confrontation.  III, IV, VI: Extraocular movements are intact without nystagmus. Pupils equally round and reactive to light  V:  Facial sensation V1-V3 equal and intact   VII: Face is symmetric with normal eye closure and smile  VIII: Hearing is bilaterally intact to finger rub  IX, X: Uvula is midline and soft palate rises symmetrically  XI: Head turning and shoulder shrug are intact.  XII: Tongue protrudes midline  Motor: Normal bulk and tone. No pronator drift. Strength bilateral upper extremity 5/5, bilateral lower extremities 5/5.  Rapid alternating movements intact and symmetric  Sensation: Intact to light touch bilaterally. No neglect or extinction on double simultaneous testing.  Coordination: No dysmetria on finger-to-nose and heel-to-shin bilaterally  Reflexes: Downgoing toes bilaterally   Gait: Narrow gait and steady    NIHSS: **** ASPECT Score: *** ICH Score: *** (GCS)    Fingerstick Blood Glucose: CAPILLARY BLOOD GLUCOSE  100 (26 May 2022 19:52)      POCT Blood Glucose.: 100 mg/dL (26 May 2022 19:08)    LABS:                        13.5   13.62 )-----------( 209      ( 26 May 2022 19:22 )             41.3     05-26    139  |  103  |  15  ----------------------------<  114<H>  4.7   |  23  |  0.77    Ca    9.8      26 May 2022 19:22    TPro  7.3  /  Alb  4.4  /  TBili  0.3  /  DBili  x   /  AST  34  /  ALT  32  /  AlkPhos  85  05-26    PT/INR - ( 26 May 2022 19:22 )   PT: 14.3 sec;   INR: 1.20          PTT - ( 26 May 2022 19:22 )  PTT:39.5 sec  CARDIAC MARKERS ( 26 May 2022 19:22 )  x     / 0.01 ng/mL / 101 U/L / x     / 2.9 ng/mL      Urinalysis Basic - ( 26 May 2022 20:25 )    Color: Yellow / Appearance: Clear / SG: <=1.005 / pH: x  Gluc: x / Ketone: NEGATIVE  / Bili: Negative / Urobili: 0.2 E.U./dL   Blood: x / Protein: NEGATIVE mg/dL / Nitrite: NEGATIVE   Leuk Esterase: Small / RBC: < 5 /HPF / WBC 5-10 /HPF   Sq Epi: x / Non Sq Epi: x / Bacteria: Present /HPF        RADIOLOGY & ADDITIONAL STUDIES:    HCT:     CTA:    -----------------------------------------------------------------------------------------    ASSESSMENT/PLAN:    72y Female w/ PMH ***. HCT showed ***. CTA showed ***. Given *** tPA was ***. Patient was admitted to **** for further workup    STROKE HPI    HPI: 72y Female with PMHx of Afib (on Eliquis and Metoprolol), HLD on Crestor P/W acute confusion/ forgetfulness since 4:30pm on 05/26. Per pt, was in [facility] this am, had her COVID booster vaccine @ 10:30am and felt fine post vaccination. Later in the evening around 4:30pm, felt confused, forgetful which she describes as " slow thinking" or not thinking clearly. Pt states she was unsure of the day of the week, unable to name the president when asked by PCP over the phone which made her seek medical attention. Pt feels this is not her normal self. Denies any HA/vision or hearing changes/dizziness/unilateral weakness. Stroke code was called upon arrival to [facility] ER. Pt taken to CT scanner and was evaluated while in the table. Non focal exam with initial NIHSS 0. CTH negative for any acute intracranial pathology. CTA H/N deferred 2/2 low suspicion for LVO and also due to national contrast shortage. Case D/W Dr. White and pt not a candidate for any intervention tPA/EVT.                    MEDICATION RECONCILIATION   MEDICATIONS  (STANDING):    MEDICATIONS  (PRN):    Allergies    sulfa drugs (Hives)    Intolerances

## 2022-05-26 NOTE — ED PROVIDER NOTE - OBJECTIVE STATEMENT
71 y/o F with a PMHX of Afib on Eliquis, presents to the ED c/o 2 hours of word finding difficulties and mild confusion. Denies any HA, vision changes, trouble speaking or swallowing, focal neuro deficits, fever, chills, CP, SOB, or dizziness. Reports never having an episode like this in the past. Reports getting her covid 19 booster this morning, and has no issue with any prior vaccines. Pt is ambulatory without any issues. States she was confused and could not remember who was the president or answer simple questions and had brought herself into the ED for evaluation.

## 2022-05-26 NOTE — H&P ADULT - NSHPSOCIALHISTORY_GEN_ALL_CORE
SOCIAL HISTORY:   Smoking status: Denies  Drinking: Drinks wine occasionally  Drug Use: never used illicit drugs

## 2022-05-26 NOTE — H&P ADULT - NSHPPHYSICALEXAM_GEN_ALL_CORE
Physical exam:  General: No acute distress, awake and alert  Cardiovascular: Regular rate and rhythm.  Pulmonary: Anterior breath sounds clear bilaterally.  GI: Abdomen soft, non-tender, non-distended    Neurologic:  -Mental status: Awake, alert, oriented to person, place, and time. Speech is fluent with intact naming, repetition, and comprehension, no dysarthria. Recent and remote memory intact. Follows commands. Attention/concentration intact. Fund of knowledge appropriate.  -Cranial nerves:   II: Visual fields are full to confrontation.  III, IV, VI: Extraocular movements are intact without nystagmus. Pupils equally round and reactive to light  V:  Facial sensation V1-V3 equal and intact   VII: Face is symmetric with normal eye closure and smile  VIII: Hearing is bilaterally intact to finger rub  IX, X: Uvula is midline and soft palate rises symmetrically  XI: Head turning and shoulder shrug are intact.  XII: Tongue protrudes midline  Motor: Normal bulk and tone. No pronator drift. Strength bilateral upper extremity 5/5, bilateral lower extremities 5/5.  Rapid alternating movements intact and symmetric  Sensation: Intact to light touch bilaterally. No neglect or extinction on double simultaneous testing.  Coordination: No dysmetria on finger-to-nose and heel-to-shin bilaterally  Reflexes: Downgoing toes bilaterally   Gait: Narrow gait and steady    NIHSS: 0

## 2022-05-27 DIAGNOSIS — I48.20 CHRONIC ATRIAL FIBRILLATION, UNSPECIFIED: ICD-10-CM

## 2022-05-27 DIAGNOSIS — R47.89 OTHER SPEECH DISTURBANCES: ICD-10-CM

## 2022-05-27 DIAGNOSIS — E78.5 HYPERLIPIDEMIA, UNSPECIFIED: ICD-10-CM

## 2022-05-27 LAB
A1C WITH ESTIMATED AVERAGE GLUCOSE RESULT: 5.6 % — SIGNIFICANT CHANGE UP (ref 4–5.6)
ANION GAP SERPL CALC-SCNC: 12 MMOL/L — SIGNIFICANT CHANGE UP (ref 5–17)
BUN SERPL-MCNC: 13 MG/DL — SIGNIFICANT CHANGE UP (ref 7–23)
CALCIUM SERPL-MCNC: 9.9 MG/DL — SIGNIFICANT CHANGE UP (ref 8.4–10.5)
CHLORIDE SERPL-SCNC: 103 MMOL/L — SIGNIFICANT CHANGE UP (ref 96–108)
CHOLEST SERPL-MCNC: 189 MG/DL — SIGNIFICANT CHANGE UP
CO2 SERPL-SCNC: 26 MMOL/L — SIGNIFICANT CHANGE UP (ref 22–31)
CREAT SERPL-MCNC: 0.79 MG/DL — SIGNIFICANT CHANGE UP (ref 0.5–1.3)
EGFR: 80 ML/MIN/1.73M2 — SIGNIFICANT CHANGE UP
ESTIMATED AVERAGE GLUCOSE: 114 MG/DL — SIGNIFICANT CHANGE UP (ref 68–114)
GLUCOSE SERPL-MCNC: 102 MG/DL — HIGH (ref 70–99)
HCT VFR BLD CALC: 40.1 % — SIGNIFICANT CHANGE UP (ref 34.5–45)
HDLC SERPL-MCNC: 78 MG/DL — SIGNIFICANT CHANGE UP
HGB BLD-MCNC: 13.2 G/DL — SIGNIFICANT CHANGE UP (ref 11.5–15.5)
LIPID PNL WITH DIRECT LDL SERPL: 90 MG/DL — SIGNIFICANT CHANGE UP
MAGNESIUM SERPL-MCNC: 2.1 MG/DL — SIGNIFICANT CHANGE UP (ref 1.6–2.6)
MCHC RBC-ENTMCNC: 30.6 PG — SIGNIFICANT CHANGE UP (ref 27–34)
MCHC RBC-ENTMCNC: 32.9 GM/DL — SIGNIFICANT CHANGE UP (ref 32–36)
MCV RBC AUTO: 92.8 FL — SIGNIFICANT CHANGE UP (ref 80–100)
NON HDL CHOLESTEROL: 111 MG/DL — SIGNIFICANT CHANGE UP
NRBC # BLD: 0 /100 WBCS — SIGNIFICANT CHANGE UP (ref 0–0)
PHOSPHATE SERPL-MCNC: 2.9 MG/DL — SIGNIFICANT CHANGE UP (ref 2.5–4.5)
PLATELET # BLD AUTO: 202 K/UL — SIGNIFICANT CHANGE UP (ref 150–400)
POTASSIUM SERPL-MCNC: 3.9 MMOL/L — SIGNIFICANT CHANGE UP (ref 3.5–5.3)
POTASSIUM SERPL-SCNC: 3.9 MMOL/L — SIGNIFICANT CHANGE UP (ref 3.5–5.3)
RBC # BLD: 4.32 M/UL — SIGNIFICANT CHANGE UP (ref 3.8–5.2)
RBC # FLD: 13.1 % — SIGNIFICANT CHANGE UP (ref 10.3–14.5)
SODIUM SERPL-SCNC: 141 MMOL/L — SIGNIFICANT CHANGE UP (ref 135–145)
TRIGL SERPL-MCNC: 105 MG/DL — SIGNIFICANT CHANGE UP
TSH SERPL-MCNC: 3.6 UIU/ML — SIGNIFICANT CHANGE UP (ref 0.27–4.2)
WBC # BLD: 9.15 K/UL — SIGNIFICANT CHANGE UP (ref 3.8–10.5)
WBC # FLD AUTO: 9.15 K/UL — SIGNIFICANT CHANGE UP (ref 3.8–10.5)

## 2022-05-27 PROCEDURE — 93306 TTE W/DOPPLER COMPLETE: CPT | Mod: 26

## 2022-05-27 PROCEDURE — 70544 MR ANGIOGRAPHY HEAD W/O DYE: CPT | Mod: 26

## 2022-05-27 PROCEDURE — 99221 1ST HOSP IP/OBS SF/LOW 40: CPT

## 2022-05-27 PROCEDURE — 70547 MR ANGIOGRAPHY NECK W/O DYE: CPT | Mod: 26

## 2022-05-27 PROCEDURE — 93970 EXTREMITY STUDY: CPT | Mod: 26

## 2022-05-27 PROCEDURE — 99223 1ST HOSP IP/OBS HIGH 75: CPT

## 2022-05-27 RX ORDER — METOPROLOL TARTRATE 50 MG
12.5 TABLET ORAL
Refills: 0 | Status: DISCONTINUED | OUTPATIENT
Start: 2022-05-27 | End: 2022-05-28

## 2022-05-27 RX ORDER — INFLUENZA VIRUS VACCINE 15; 15; 15; 15 UG/.5ML; UG/.5ML; UG/.5ML; UG/.5ML
0.7 SUSPENSION INTRAMUSCULAR ONCE
Refills: 0 | Status: DISCONTINUED | OUTPATIENT
Start: 2022-05-27 | End: 2022-05-28

## 2022-05-27 RX ORDER — ROSUVASTATIN CALCIUM 5 MG/1
10 TABLET ORAL AT BEDTIME
Refills: 0 | Status: DISCONTINUED | OUTPATIENT
Start: 2022-05-27 | End: 2022-05-28

## 2022-05-27 RX ORDER — ACETAMINOPHEN 500 MG
650 TABLET ORAL EVERY 6 HOURS
Refills: 0 | Status: DISCONTINUED | OUTPATIENT
Start: 2022-05-27 | End: 2022-05-28

## 2022-05-27 RX ADMIN — APIXABAN 5 MILLIGRAM(S): 2.5 TABLET, FILM COATED ORAL at 09:18

## 2022-05-27 RX ADMIN — Medication 650 MILLIGRAM(S): at 06:29

## 2022-05-27 RX ADMIN — APIXABAN 5 MILLIGRAM(S): 2.5 TABLET, FILM COATED ORAL at 18:50

## 2022-05-27 RX ADMIN — ROSUVASTATIN CALCIUM 10 MILLIGRAM(S): 5 TABLET ORAL at 22:16

## 2022-05-27 RX ADMIN — Medication 650 MILLIGRAM(S): at 08:05

## 2022-05-27 RX ADMIN — Medication 12.5 MILLIGRAM(S): at 18:50

## 2022-05-27 RX ADMIN — Medication 12.5 MILLIGRAM(S): at 05:47

## 2022-05-27 NOTE — PHYSICAL THERAPY INITIAL EVALUATION ADULT - MODALITIES TREATMENT COMMENTS
Cranial Nerves II - XII: II: PERRLA; visual fields are full to confrontation III, IV, VI: EOMI, no nystagmus appreciated. Vision H-Test: bilateral tracking and smooth pursuit grossly intact; Convergence/Divergence: intact; Vision Quadrant Test: intact V: facial sensation intact to light touch V1-V3 b/l VII: no ptosis, no facial droop, symmetric eyebrow raise and closure VIII: hearing intact to finger rub b/l  XI: head turning and shoulder shrug intact b/l XII: tongue protrusion midline.

## 2022-05-27 NOTE — PHYSICAL THERAPY INITIAL EVALUATION ADULT - PERTINENT HX OF CURRENT PROBLEM, REHAB EVAL
72y Female w/ PMH of Afib (on eliquis and Metoprolol), HLD on crestor P/W acute confusion/ forgetfulness since 4:30pm on 05/26. Stroke code was called upon arrival to [facility] ER with initial CTH negative for any acute intracranial pathology. Initial NIHSS 0. Non focal exam. CTA H/N deferred 2/2 low suspicion for LVO and due to national contrast shortage. Pt admitted to Stroke unit for further work up.

## 2022-05-27 NOTE — PROGRESS NOTE ADULT - ASSESSMENT
ASSESSMENT/PLAN:    72y Female w/ PMH of Afib (on eliquis and Metoprolol), HLD on crestor P/W acute confusion/ forgetfulness since 4:30pm on 05/26. Stroke code was called upon arrival to [facility] ER with initial CTH negative for any acute intracranial pathology. Initial NIHSS 0. Non focal exam. CTA H/N deferred 2/2 low suspicion for LVO and due to national contrast shortage. Pt admitted to Stroke unit for further work up.    Neuro  #CVA workup  - C/W Eliquis 5mg BID  - C/W Crestor 10mg Po daily (per pt cannot take atorva 2/2 hair loss).  - q4hr stroke neuro checks and vitals  - obtain MRA Head and neck  - Stroke Code HCT Results: Negative   - Stroke education    Cards  #HTN  - permissive hypertension, Goal -180  - on home metoprolol ER 25 mg QD  - Started on Metoprolol tartrate 12.5mg PO BID for rate control and to avoid reflex tachycardia(pt with pAF and stated that she felt her irregular rhythm multiple times on the day of admission).  - obtain TTE with bubble  - Stroke Code EKG Results: F/U official read.    #HLD  - C/W home dose statin as above in CVA  - LDL results:  90    Pulm  - call provider if SPO2 < 94%    GI  #Nutrition/Fluids/Electrolytes   - replete K<4 and Mg <2  - Diet: DASH/TLC  - IVF: None for now.    Renal  - C/T trend Bun/Crt    Infectious Disease  - Stroke Code CXR results: F/U official read.    Endocrine  - A1C results: pending  - TSH results: Pending    DVT Prophylaxis  - On Eliquis  - SCDs for DVT prophylaxis     Dispo: Home, independent per PT/OT. pending MRA head and neck, and TTE     Discussed daily hospital plans and goals with patient at bedside.     Discussed with Neurology Attending Dr. White   ASSESSMENT/PLAN:    72y Female w/ PMH of Afib (on eliquis and Metoprolol), HLD on crestor P/W acute confusion/ forgetfulness since 4:30pm on 05/26. Stroke code was called upon arrival to [facility] ER with initial CTH negative for any acute intracranial pathology. Initial NIHSS 0. Non focal exam. CTA H/N deferred 2/2 low suspicion for LVO and due to national contrast shortage. Pt admitted to Stroke unit for further work up.    Neuro  #CVA workup  - C/W Eliquis 5mg BID  - C/W Crestor 10mg Po daily (per pt cannot take atorva 2/2 hair loss).  - q4hr stroke neuro checks and vitals  - obtain MRA Head and neck  - Stroke Code HCT Results: Negative   - Stroke education    Cards  #HTN  - permissive hypertension, Goal -180  - on home metoprolol ER 25 mg QD  - Started on Metoprolol tartrate 12.5mg PO BID for rate control and to avoid reflex tachycardia(pt with pAF and stated that she felt her irregular rhythm multiple times on the day of admission).  - obtain TTE with bubble  - Stroke Code EKG Results: F/U official read.    #HLD  - C/W home dose statin as above in CVA  - LDL results:  90    Pulm  - call provider if SPO2 < 94%    GI  #Nutrition/Fluids/Electrolytes   - replete K<4 and Mg <2  - Diet: DASH/TLC  - IVF: None for now.    Renal  - C/T trend Bun/Crt    Infectious Disease  - Stroke Code CXR results: F/U official read.    Endocrine  - A1C results: pending  - TSH results: Pending    DVT Prophylaxis  - On Eliquis  - SCDs for DVT prophylaxis     Dispo: Home, independent per PT/OT. pending MRA head and neck, and TTE     Discussed daily hospital plans and goals with patient at bedside.     Discussed with Neurology Attending Dr. Dixon

## 2022-05-27 NOTE — PATIENT PROFILE ADULT - FALL HARM RISK - HARM RISK INTERVENTIONS

## 2022-05-27 NOTE — OCCUPATIONAL THERAPY INITIAL EVALUATION ADULT - ADDITIONAL COMMENTS
Patient reports living in an elevator apt building with 3 JANE. Patient reports being independent with ADL and IADl prior to this admission. Patient reports walking outside daily.

## 2022-05-27 NOTE — CONSULT NOTE ADULT - PROBLEM SELECTOR RECOMMENDATION 9
unclear etiology; sx resolved, concerning for TIA; cont. work-up and mgmt per Neuro, plan for MRI/MRA; already takes DOAC + statin

## 2022-05-27 NOTE — OCCUPATIONAL THERAPY INITIAL EVALUATION ADULT - PERTINENT HX OF CURRENT PROBLEM, REHAB EVAL
72y Female w/ PMH of Afib (on eliquis and Metoprolol), HLD on crestor P/W acute confusion/ forgetfulness since 4:30pm on 05/26. Stroke code was called upon arrival to [facility] ER with initial CTH negative for any acute intracranial pathology. Initial NIHSS 0. Non focal exam. CTA H/N deferred 2/2 low suspicion for LVO and due to national contrast shortage. Pt admitted to Stroke unit for further work up.

## 2022-05-27 NOTE — PROGRESS NOTE ADULT - SUBJECTIVE AND OBJECTIVE BOX
OVERNIGHT EVENTS:    SUBJECTIVE / INTERVAL HPI: Patient seen and examined at bedside.     VITAL SIGNS:  Vital Signs Last 24 Hrs  T(C): 37.9 (27 May 2022 06:30), Max: 37.9 (27 May 2022 06:30)  T(F): 100.2 (27 May 2022 06:30), Max: 100.2 (27 May 2022 06:30)  HR: 78 (27 May 2022 04:33) (70 - 85)  BP: 129/61 (27 May 2022 04:33) (129/61 - 177/81)  BP(mean): 85 (27 May 2022 04:33) (85 - 100)  RR: 20 (27 May 2022 04:33) (16 - 20)  SpO2: 99% (27 May 2022 04:33) (97% - 100%)    PHYSICAL EXAM:    General: WDWN, NAD, resting comfortably in bed  HEENT: NC/AT; anicteric sclera; MMM  Neck: supple  Cardiovascular: +S1/S2; RRR  Respiratory: CTA B/L; no W/R/R  Gastrointestinal: soft, NT/ND; +BSx4  Extremities: WWP; no edema, clubbing or cyanosis  Vascular: 2+ radial, DP/PT pulses B/L  Neurological: AAOx3    MEDICATIONS:  MEDICATIONS  (STANDING):  apixaban 5 milliGRAM(s) Oral every 12 hours  influenza  Vaccine (HIGH DOSE) 0.7 milliLiter(s) IntraMuscular once  metoprolol tartrate 12.5 milliGRAM(s) Oral two times a day  rosuvastatin 10 milliGRAM(s) Oral at bedtime    MEDICATIONS  (PRN):  acetaminophen     Tablet .. 650 milliGRAM(s) Oral every 6 hours PRN Temp greater or equal to 38C (100.4F)      ALLERGIES:  Allergies    sulfa drugs (Hives)    Intolerances        LABS:                        13.2   9.15  )-----------( 202      ( 27 May 2022 05:51 )             40.1     05-27    141  |  103  |  13  ----------------------------<  102<H>  3.9   |  26  |  0.79    Ca    9.9      27 May 2022 05:51  Phos  2.9     05-27  Mg     2.1     05-27    TPro  7.3  /  Alb  4.4  /  TBili  0.3  /  DBili  x   /  AST  34  /  ALT  32  /  AlkPhos  85  05-26    PT/INR - ( 26 May 2022 19:22 )   PT: 14.3 sec;   INR: 1.20          PTT - ( 26 May 2022 19:22 )  PTT:39.5 sec  Urinalysis Basic - ( 26 May 2022 20:25 )    Color: Yellow / Appearance: Clear / SG: <=1.005 / pH: x  Gluc: x / Ketone: NEGATIVE  / Bili: Negative / Urobili: 0.2 E.U./dL   Blood: x / Protein: NEGATIVE mg/dL / Nitrite: NEGATIVE   Leuk Esterase: Small / RBC: < 5 /HPF / WBC 5-10 /HPF   Sq Epi: x / Non Sq Epi: x / Bacteria: Present /HPF      CAPILLARY BLOOD GLUCOSE  100 (26 May 2022 19:52)      POCT Blood Glucose.: 100 mg/dL (26 May 2022 19:08)      RADIOLOGY & ADDITIONAL TESTS: Reviewed.   OVERNIGHT EVENTS: ELIS    SUBJECTIVE / INTERVAL HPI: Patient seen and examined at bedside. Pt reports feeling well and states she feels like she is back to her baseline. She states confusion resolved yesterday. Denies any weakness, numbness, visual changes, headaches or dizziness.     VITAL SIGNS:  Vital Signs Last 24 Hrs  T(C): 37.9 (27 May 2022 06:30), Max: 37.9 (27 May 2022 06:30)  T(F): 100.2 (27 May 2022 06:30), Max: 100.2 (27 May 2022 06:30)  HR: 78 (27 May 2022 04:33) (70 - 85)  BP: 129/61 (27 May 2022 04:33) (129/61 - 177/81)  BP(mean): 85 (27 May 2022 04:33) (85 - 100)  RR: 20 (27 May 2022 04:33) (16 - 20)  SpO2: 99% (27 May 2022 04:33) (97% - 100%)    PHYSICAL EXAM:    General: WDWN, NAD, resting comfortably in bed  Neurologic:     -Mental Status: AAOx3. Speech is fluent with intact naming, repetition, and comprehension, no dysarthria. Recent and remote memory intact. Follows commands. Attention/concentration intact. Fund of knowledge appropriate.     -Cranial Nerves:          II: Visual fields are full to confrontation.          III, IV, VI: EOMI without nystagmus. PERRL b/l          V:  Facial sensation V1-V3 equal and intact           VII: Face is symmetric with normal eye closure and smile          VIII: Hearing is bilaterally intact to finger rub          IX, X: Uvula is midline and soft palate rises symmetrically          XI: Head turning and shoulder shrug are intact.          XII: Tongue protrudes midline     -Motor: Normal bulk and tone. Strength bilateral upper extremity 5/5, bilateral lower extremities 5/5.                     No pronator drift. Rapid alternating movements intact and symmetric     -Sensation: Intact to light touch bilaterally. No neglect or extinction on double simultaneous testing.     -Coordination: No dysmetria on finger-to-nose and heel-to-shin bilaterally     -Reflexes: Downgoing toes bilaterally      -Gait: deferred    MEDICATIONS:  MEDICATIONS  (STANDING):  apixaban 5 milliGRAM(s) Oral every 12 hours  influenza  Vaccine (HIGH DOSE) 0.7 milliLiter(s) IntraMuscular once  metoprolol tartrate 12.5 milliGRAM(s) Oral two times a day  rosuvastatin 10 milliGRAM(s) Oral at bedtime    MEDICATIONS  (PRN):  acetaminophen     Tablet .. 650 milliGRAM(s) Oral every 6 hours PRN Temp greater or equal to 38C (100.4F)      ALLERGIES:  Allergies    sulfa drugs (Hives)    Intolerances        LABS:                        13.2   9.15  )-----------( 202      ( 27 May 2022 05:51 )             40.1     05-27    141  |  103  |  13  ----------------------------<  102<H>  3.9   |  26  |  0.79    Ca    9.9      27 May 2022 05:51  Phos  2.9     05-27  Mg     2.1     05-27    TPro  7.3  /  Alb  4.4  /  TBili  0.3  /  DBili  x   /  AST  34  /  ALT  32  /  AlkPhos  85  05-26    PT/INR - ( 26 May 2022 19:22 )   PT: 14.3 sec;   INR: 1.20          PTT - ( 26 May 2022 19:22 )  PTT:39.5 sec  Urinalysis Basic - ( 26 May 2022 20:25 )    Color: Yellow / Appearance: Clear / SG: <=1.005 / pH: x  Gluc: x / Ketone: NEGATIVE  / Bili: Negative / Urobili: 0.2 E.U./dL   Blood: x / Protein: NEGATIVE mg/dL / Nitrite: NEGATIVE   Leuk Esterase: Small / RBC: < 5 /HPF / WBC 5-10 /HPF   Sq Epi: x / Non Sq Epi: x / Bacteria: Present /HPF      CAPILLARY BLOOD GLUCOSE  100 (26 May 2022 19:52)      POCT Blood Glucose.: 100 mg/dL (26 May 2022 19:08)      RADIOLOGY & ADDITIONAL TESTS: Reviewed.

## 2022-05-27 NOTE — OCCUPATIONAL THERAPY INITIAL EVALUATION ADULT - MODALITIES TREATMENT COMMENTS
Cranial Nerves II - XII: II: PERRLA; visual fields are full to confrontation III, IV, VI: EOMI, no nystagmus appreciated; V: facial sensation intact to light touch V1-V3 b/l; VII: no ptosis, no facial droop, symmetric eyebrow raise and closure; VIII: hearing intact to finger rub b/l  IX, X: uvula is midline, soft palate rises symmetrically; XI: head turning and shoulder shrug intact b/l; XII: tongue protrusion midline. patient completed functional mobility >100ft and stair negotiation with independence and no LOB. Patient is home no needs. Pt demonstrated independence with ADL, functional mobility and functional transfers. Pt with no acute skilled OT needs at this time and to be discharged from OT program.

## 2022-05-27 NOTE — CONSULT NOTE ADULT - SUBJECTIVE AND OBJECTIVE BOX
Patient is a 72y old  Female who presents with a chief complaint of AMS/Acute Confusion (27 May 2022 08:57)    HPI:   STROKE HPI    HPI: 72y Female with PMHx of Afib (on Eliquis and Metoprolol), HLD on Crestor P/W acute confusion/ forgetfulness since 4:30pm on 05/26. Per pt, was in [facility] this am, had her COVID booster vaccine @ 10:30am and felt fine post vaccination. Later in the evening around 4:30pm, felt confused, forgetful which she describes as " slow thinking" or not thinking clearly. Pt states she was unsure of the day of the week, unable to name the president when asked by PCP over the phone which made her seek medical attention. Pt feels this is not her normal self. Denies any HA/vision or hearing changes/dizziness/unilateral weakness. Stroke code was called upon arrival to [facility] ER. Pt taken to CT scanner and was evaluated while in the table. Non focal exam with initial NIHSS 0. CTH negative for any acute intracranial pathology. CTA H/N deferred 2/2 low suspicion for LVO and also due to national contrast shortage. Case D/W Dr. White and pt not a candidate for any intervention tPA/EVT.     (26 May 2022 21:31)    Review of Systems: 12 point review of systems otherwise negative    PAST MEDICAL & SURGICAL HISTORY:  HLD (hyperlipidemia)      Afib      Social History:  SOCIAL HISTORY:   Smoking status: Denies  Drinking: Drinks wine occasionally  Drug Use: never used illicit drugs (26 May 2022 21:31)    FAMILY HISTORY:  no h/o stroke in first-degree relatives    MEDICATIONS  (STANDING):  apixaban 5 milliGRAM(s) Oral every 12 hours  influenza  Vaccine (HIGH DOSE) 0.7 milliLiter(s) IntraMuscular once  metoprolol tartrate 12.5 milliGRAM(s) Oral two times a day  rosuvastatin 10 milliGRAM(s) Oral at bedtime    MEDICATIONS  (PRN):  acetaminophen     Tablet .. 650 milliGRAM(s) Oral every 6 hours PRN Temp greater or equal to 38C (100.4F)      Allergies    sulfa drugs (Hives)    Intolerances          Vital Signs Last 24 Hrs  T(C): 37.3 (27 May 2022 10:00), Max: 37.9 (27 May 2022 06:30)  T(F): 99.1 (27 May 2022 10:00), Max: 100.2 (27 May 2022 06:30)  HR: 78 (27 May 2022 15:55) (70 - 85)  BP: 148/70 (27 May 2022 15:55) (113/56 - 177/81)  BP(mean): 100 (27 May 2022 15:55) (79 - 100)  RR: 17 (27 May 2022 15:55) (16 - 20)  SpO2: 98% (27 May 2022 15:55) (95% - 100%)  CAPILLARY BLOOD GLUCOSE  100 (26 May 2022 19:52)      POCT Blood Glucose.: 100 mg/dL (26 May 2022 19:08)        Physical Exam:  (earlier today)  Daily     Daily   General:  well-appearing in NAD, sitting in a chair  HEENT:  MMM  CV:  RRR  Lungs:  CTA B/L  Abdomen:  soft NT ND  Extremities:  no edema B/L LE  Skin:  WWP  Neuro:  AAOx3, no dysarthria or word-finding difficulty    LABS:                        13.2   9.15  )-----------( 202      ( 27 May 2022 05:51 )             40.1     05-27    141  |  103  |  13  ----------------------------<  102<H>  3.9   |  26  |  0.79    Ca    9.9      27 May 2022 05:51  Phos  2.9     05-27  Mg     2.1     05-27    TPro  7.3  /  Alb  4.4  /  TBili  0.3  /  DBili  x   /  AST  34  /  ALT  32  /  AlkPhos  85  05-26    PT/INR - ( 26 May 2022 19:22 )   PT: 14.3 sec;   INR: 1.20          PTT - ( 26 May 2022 19:22 )  PTT:39.5 sec  Urinalysis Basic - ( 26 May 2022 20:25 )    Color: Yellow / Appearance: Clear / SG: <=1.005 / pH: x  Gluc: x / Ketone: NEGATIVE  / Bili: Negative / Urobili: 0.2 E.U./dL   Blood: x / Protein: NEGATIVE mg/dL / Nitrite: NEGATIVE   Leuk Esterase: Small / RBC: < 5 /HPF / WBC 5-10 /HPF   Sq Epi: x / Non Sq Epi: x / Bacteria: Present /HPF    
  Patient is a 72y old  Female who presents with a chief complaint of AMS/Acute Confusion (27 May 2022 07:25)        HPI:   STROKE HPI    HPI: 72y Female with PMHx of Afib (on Eliquis and Metoprolol), HLD on Crestor P/W acute confusion/ forgetfulness since 4:30pm on 05/26. Per pt, was in [facility] this am, had her COVID booster vaccine @ 10:30am and felt fine post vaccination. Later in the evening around 4:30pm, felt confused, forgetful which she describes as " slow thinking" or not thinking clearly. Pt states she was unsure of the day of the week, unable to name the president when asked by PCP over the phone which made her seek medical attention. Pt feels this is not her normal self. Denies any HA/vision or hearing changes/dizziness/unilateral weakness. Stroke code was called upon arrival to [facility] ER. Pt taken to CT scanner and was evaluated while in the table. Non focal exam with initial NIHSS 0. CTH negative for any acute intracranial pathology. CTA H/N deferred 2/2 low suspicion for LVO and also due to national contrast shortage. Case D/W Dr. White and pt not a candidate for any intervention tPA/EVT.                    MEDICATION RECONCILIATION   MEDICATIONS  (STANDING):    MEDICATIONS  (PRN):    Allergies    sulfa drugs (Hives)    Intolerances       (26 May 2022 21:31)      PAST MEDICAL & SURGICAL HISTORY:  HLD (hyperlipidemia)      Afib      MEDICATIONS  (STANDING):  apixaban 5 milliGRAM(s) Oral every 12 hours  influenza  Vaccine (HIGH DOSE) 0.7 milliLiter(s) IntraMuscular once  metoprolol tartrate 12.5 milliGRAM(s) Oral two times a day  rosuvastatin 10 milliGRAM(s) Oral at bedtime    MEDICATIONS  (PRN):  acetaminophen     Tablet .. 650 milliGRAM(s) Oral every 6 hours PRN Temp greater or equal to 38C (100.4F)        Social History:                  -  Home Living Status: ,   lives alone in an elevator accessible apartment building         -  Prior Home Care Services:   none      Baseline Functional Level Prior to Admission:             - ADL's/ IADL's:  patient states she is fully independent         - ambulatory status PTA:  walks without devices     FAMILY HISTORY:    CBC Full  -  ( 27 May 2022 05:51 )  WBC Count : 9.15 K/uL  RBC Count : 4.32 M/uL  Hemoglobin : 13.2 g/dL  Hematocrit : 40.1 %  Platelet Count - Automated : 202 K/uL  Mean Cell Volume : 92.8 fl  Mean Cell Hemoglobin : 30.6 pg  Mean Cell Hemoglobin Concentration : 32.9 gm/dL  Auto Neutrophil # : x  Auto Lymphocyte # : x  Auto Monocyte # : x  Auto Eosinophil # : x  Auto Basophil # : x  Auto Neutrophil % : x  Auto Lymphocyte % : x  Auto Monocyte % : x  Auto Eosinophil % : x  Auto Basophil % : x      05-27    141  |  103  |  13  ----------------------------<  102<H>  3.9   |  26  |  0.79    Ca    9.9      27 May 2022 05:51  Phos  2.9     05-27  Mg     2.1     05-27    TPro  7.3  /  Alb  4.4  /  TBili  0.3  /  DBili  x   /  AST  34  /  ALT  32  /  AlkPhos  85  05-26      Urinalysis Basic - ( 26 May 2022 20:25 )    Color: Yellow / Appearance: Clear / SG: <=1.005 / pH: x  Gluc: x / Ketone: NEGATIVE  / Bili: Negative / Urobili: 0.2 E.U./dL   Blood: x / Protein: NEGATIVE mg/dL / Nitrite: NEGATIVE   Leuk Esterase: Small / RBC: < 5 /HPF / WBC 5-10 /HPF   Sq Epi: x / Non Sq Epi: x / Bacteria: Present /HPF          Radiology:    < from: CT Brain Stroke Protocol (05.26.22 @ 19:18) >  ACC: [id number] EXAM:  CT BRAIN STROKE PROTOCOL                          PROCEDURE DATE:  05/26/2022          INTERPRETATION:  IGeraldine MD, have reviewed the images and the   report and agree with the findings.    PROCEDURE: CT head without intravenous contrast    CLINICAL INDICATION: Stroke code. Global confusion 2 hrs ago. now   improving. On AC.    TECHNIQUE:  Multiple axial images were obtained and viewed at 5 mm   intervals from the skull base to the vertex. The images were reviewed in   brain and bone windows. Sagittal and coronal reformations are provided.    COMPARISON EXAMINATION: None.    FINDINGS:  VENTRICLES AND SULCI: The ventricles, cisternal spaces, and sulci are   appropriate for patient's age. No hydrocephalus.  INTRA-AXIAL: No intracranial mass, acute hemorrhage, or midline shift is   present. No acute transcortical infarction. There are scattered   hypodensities throughout the periventricular white matter, likely the   sequela of chronic small vessel ischemicdisease.  EXTRA-AXIAL: No extra-axial fluid collection is present.  VISUALIZED SINUSES: No air-fluid levels are identified.  VISUALIZED MASTOIDS: Well aerated.  CALVARIUM: No fracture.  MISCELLANEOUS:  None.    IMPRESSION:    No acute intracranial hemorrhage or transcortical infarction.                Vital Signs Last 24 Hrs  T(C): 37.9 (27 May 2022 06:30), Max: 37.9 (27 May 2022 06:30)  T(F): 100.2 (27 May 2022 06:30), Max: 100.2 (27 May 2022 06:30)  HR: 70 (27 May 2022 08:35) (70 - 85)  BP: 125/60 (27 May 2022 08:35) (125/60 - 177/81)  BP(mean): 86 (27 May 2022 08:35) (85 - 100)  RR: 18 (27 May 2022 08:35) (16 - 20)  SpO2: 95% (27 May 2022 08:35) (95% - 100%)        REVIEW OF SYSTEMS:      CONSTITUTIONAL: No fever, weight loss, or fatigue  EYES: No eye pain, visual disturbances, or discharge  ENMT:  No difficulty hearing, tinnitus, vertigo; No sinus or throat pain  NECK: No pain or stiffness  BREASTS: No pain, masses, or nipple discharge  RESPIRATORY: No cough, wheezing, chills or hemoptysis; No shortness of breath  CARDIOVASCULAR: No chest pain, palpitations, dizziness, or leg swelling  GASTROINTESTINAL: No abdominal or epigastric pain. No nausea, vomiting, or hematemesis; No diarrhea or constipation. No melena or hematochezia.  GENITOURINARY: No dysuria, frequency, hematuria, or incontinence  NEUROLOGICAL: per HPI  SKIN: No itching, burning, rashes, or lesions   LYMPH NODES: No enlarged glands  ENDOCRINE: No heat or cold intolerance; No hair loss  MUSCULOSKELETAL: No joint pain or swelling; No muscle, back, or extremity pain  PSYCHIATRIC: No depression, anxiety, mood swings, or difficulty sleeping  HEME/LYMPH: No easy bruising, or bleeding gums  ALLERGY AND IMMUNOLOGIC: No hives or eczema  VASCULAR: no swelling, erythema          Physical Exam:  WDWN 71 yo  woman  lying in semi Land's position, awake, alert,  no complaints, asked if her confusion was from her booster vax    Head: normocephalic, atraumatic    Eyes: PERRLA, EOMI, no nystagmus, sclera anicteric    ENT: nasal discharge, uvula midline, no oropharyngeal erythema/exudate    Neck: supple, negative JVD, negative carotid bruits, no thyromegaly    Chest: CTA bilaterally, neg wheeze/ rhonchi/ rales/ crackles/ egophany    Cardiovascular: regular rate and rhythm, neg murmurs/rubs/gallops    Abdomen: soft, non distended, non tender to palpation in all 4 quadrants, negative rebound/guarding, normal bowel sounds    Extremities: WWP, neg cyanosis/clubbing/edema      Neurologic Exam:    Alert and oriented to person, age , place , date/year, speech fluent w/o dysarthria , follows commands , recent and remote memory intact , repetition intact , comprehension intact ,  attention/concentration intact , fund of knowledge appropriate    Cranial Nerves:     II:                         pupils equal, round and reactive to light, visual fields intact   III/ IV/VI:              extraocular movements intact, neg nystagmus, neg ptosis  V:                        facial sensation intact, V1-3 normal  VII:                      face symmetric, no droop, normal eye closure and smile  VIII:                     hearing intact to finger rub bilaterally  IX and X:             no hoarseness, gag intact, palate/ uvula rise symmetrically  XI:                       SCM/ trapezius strength intact bilateral  XII:                      no tongue deviation    Motor Exam:    Right UE:              : 5/5  wrist extensors/ flexors: 5/5  biceps:   5/5                    triceps:  5/5  deltoid:  5/5    negative pronator drift                               Left UE:                 : 5/5  wrist extensors/ flexors: 5/5  biceps:   5/5                    triceps:  5/5  deltoid:  5/5    negative pronator drift        Right LE:                dorsiflexors:  5/5  plantar flexors:  5/5  quadriceps:  5/5  hamstrings:  5/5  hip flexors:  5/5      Left LE:                 dorsiflexors:  5/5  plantar flexors:  5/5  quadriceps:  5/5  hamstrings:  5/5  hip flexors:  5/5    Sensation:         intact to light touch x 4 extremities                         no neglect or extinction on double simultaneous testing                       DTR:                     biceps/brachioradialis: equal                                              patella/ankle: equal                              neg Babinski                        Coordination:      Finger to Nose:  neg dysmetria bilaterally      Gait:  not tested              PM&R Impression:    1) transient confusion, NIHSS 0  2) no focal neuro deficits    Recommendations/ Plan :    1) Physical / Occupational therapy focusing on therapeutic exercises, bed mobility/transfer out of bed evaluation, progressive ambulation with assistive devices prn.    2) Anticipated Disposition Plan/Recs  :   d/c home

## 2022-05-27 NOTE — PROGRESS NOTE ADULT - NS ATTEND AMEND GEN_ALL_CORE FT
The patient is a 72-year-old female who has a history of atrial fibrillation for which she is on Eliquis and hyperlipidemia admitted with a transient episode (few hours) of confusion/disorientation/?word finding difficulties for a few hours without other associated neurologic symptoms. Symptoms occurred several hours after receiving COVID booster. She has been compliant with Eliquis. Plan for MRI/MRA head/neck, TTE for TIA evaluation though symptoms were not classic for that. Will discuss symptoms with PCP as they noted the deficits on telephone when the patient called. May need EEG which can likely be done outpatient.

## 2022-05-27 NOTE — CONSULT NOTE ADULT - ASSESSMENT
per Neurology    72 y o Female w/ PMH of Afib (on eliquis and Metoprolol), HLD on crestor P/W acute confusion/ forgetfulness since 4:30pm on 05/26. Stroke code was called upon arrival to [facility] ER with initial CTH negative for any acute intracranial pathology. Initial NIHSS 0. Non focal exam. CTA H/N deferred 2/2 low suspicion for LVO and due to national contrast shortage. Pt admitted to Stroke unit for further work up.    Neuro  #CVA workup  - C/W Eliquis 5mg BID  - C/W Crestor 10mg Po daily (per pt cannot take atorva 2/2 hair loss).  - q4hr stroke neuro checks and vitals  - obtain MRI Brain without contrast  - Stroke Code HCT Results: Negative   - Stroke education    Cards  #HTN  - permissive hypertension, Goal -180  - hold home blood pressure medication for now  - Started on Metoprolol tartrate 12.5mg PO BID for rate control and to avoid reflex tachycardia(pt with pAF and stated that she felt her irregular rhythm multiple times on the day of admission).  - obtain TTE with bubble  - Stroke Code EKG Results: F/U official read.    #HLD  - C/W home dose statin as above in CVA  - LDL results:  pending    Pulm  - call provider if SPO2 < 94%    GI  #Nutrition/Fluids/Electrolytes   - replete K<4 and Mg <2  - Diet: DASH/TLC  - IVF: None for now.    Renal  - C/T trend Bun/Crt    Infectious Disease  - Stroke Code CXR results: F/U official read.    Endocrine  - A1C results: pending  - TSH results: Pending    DVT Prophylaxis  - On Eliquis  - SCDs for DVT prophylaxis 
71 y/o F w/

## 2022-05-27 NOTE — PHYSICAL THERAPY INITIAL EVALUATION ADULT - GENERAL OBSERVATIONS, REHAB EVAL
PT IE Completed. Pt received semi-supine in bed, A&Ox4, +RA,+heplock, in NAD and agreeable to work with PT, BO Piña notified.Pt left in semi-supine, +bed alarm, +call horner within reach, BO Piña notified. Pt can benefit from PT in order to improve quality of life by increasing strength/endurance/balance with functional mobility and ADLS. PT IE Completed. MRS: 0. Pt received semi-supine in bed, A&Ox4, +RA,+heplock, in NAD and agreeable to work with PT, BO Piña notified.Pt left in seated in bedside chair, +chair alarm, +call horner within reach, BO Piña notified. Pt will be d/c from PT program.

## 2022-05-28 ENCOUNTER — TRANSCRIPTION ENCOUNTER (OUTPATIENT)
Age: 72
End: 2022-05-28

## 2022-05-28 VITALS — TEMPERATURE: 98 F

## 2022-05-28 DIAGNOSIS — R47.89 OTHER SPEECH DISTURBANCES: ICD-10-CM

## 2022-05-28 LAB
CULTURE RESULTS: SIGNIFICANT CHANGE UP
SPECIMEN SOURCE: SIGNIFICANT CHANGE UP

## 2022-05-28 PROCEDURE — 99239 HOSP IP/OBS DSCHRG MGMT >30: CPT

## 2022-05-28 PROCEDURE — 97162 PT EVAL MOD COMPLEX 30 MIN: CPT

## 2022-05-28 PROCEDURE — 93970 EXTREMITY STUDY: CPT

## 2022-05-28 PROCEDURE — 83735 ASSAY OF MAGNESIUM: CPT

## 2022-05-28 PROCEDURE — 84443 ASSAY THYROID STIM HORMONE: CPT

## 2022-05-28 PROCEDURE — 84100 ASSAY OF PHOSPHORUS: CPT

## 2022-05-28 PROCEDURE — 87635 SARS-COV-2 COVID-19 AMP PRB: CPT

## 2022-05-28 PROCEDURE — 80053 COMPREHEN METABOLIC PANEL: CPT

## 2022-05-28 PROCEDURE — 97161 PT EVAL LOW COMPLEX 20 MIN: CPT

## 2022-05-28 PROCEDURE — 85610 PROTHROMBIN TIME: CPT

## 2022-05-28 PROCEDURE — 81001 URINALYSIS AUTO W/SCOPE: CPT

## 2022-05-28 PROCEDURE — 70544 MR ANGIOGRAPHY HEAD W/O DYE: CPT

## 2022-05-28 PROCEDURE — 70547 MR ANGIOGRAPHY NECK W/O DYE: CPT

## 2022-05-28 PROCEDURE — 82550 ASSAY OF CK (CPK): CPT

## 2022-05-28 PROCEDURE — 82553 CREATINE MB FRACTION: CPT

## 2022-05-28 PROCEDURE — 83036 HEMOGLOBIN GLYCOSYLATED A1C: CPT

## 2022-05-28 PROCEDURE — 71045 X-RAY EXAM CHEST 1 VIEW: CPT

## 2022-05-28 PROCEDURE — 93005 ELECTROCARDIOGRAM TRACING: CPT

## 2022-05-28 PROCEDURE — 80048 BASIC METABOLIC PNL TOTAL CA: CPT

## 2022-05-28 PROCEDURE — 85027 COMPLETE CBC AUTOMATED: CPT

## 2022-05-28 PROCEDURE — 70450 CT HEAD/BRAIN W/O DYE: CPT | Mod: MA

## 2022-05-28 PROCEDURE — 87086 URINE CULTURE/COLONY COUNT: CPT

## 2022-05-28 PROCEDURE — 80061 LIPID PANEL: CPT

## 2022-05-28 PROCEDURE — 36415 COLL VENOUS BLD VENIPUNCTURE: CPT

## 2022-05-28 PROCEDURE — 99291 CRITICAL CARE FIRST HOUR: CPT

## 2022-05-28 PROCEDURE — 82962 GLUCOSE BLOOD TEST: CPT

## 2022-05-28 PROCEDURE — 84484 ASSAY OF TROPONIN QUANT: CPT

## 2022-05-28 PROCEDURE — 99233 SBSQ HOSP IP/OBS HIGH 50: CPT

## 2022-05-28 PROCEDURE — 93306 TTE W/DOPPLER COMPLETE: CPT

## 2022-05-28 PROCEDURE — 85730 THROMBOPLASTIN TIME PARTIAL: CPT

## 2022-05-28 PROCEDURE — 85025 COMPLETE CBC W/AUTO DIFF WBC: CPT

## 2022-05-28 RX ORDER — METOPROLOL TARTRATE 50 MG
12.5 TABLET ORAL
Qty: 0 | Refills: 0 | DISCHARGE

## 2022-05-28 RX ORDER — ACETAMINOPHEN 500 MG
325 TABLET ORAL EVERY 6 HOURS
Refills: 0 | Status: DISCONTINUED | OUTPATIENT
Start: 2022-05-28 | End: 2022-05-28

## 2022-05-28 RX ORDER — METOPROLOL TARTRATE 50 MG
1 TABLET ORAL
Qty: 0 | Refills: 0 | DISCHARGE

## 2022-05-28 RX ADMIN — Medication 12.5 MILLIGRAM(S): at 06:14

## 2022-05-28 RX ADMIN — Medication 325 MILLIGRAM(S): at 01:52

## 2022-05-28 RX ADMIN — Medication 325 MILLIGRAM(S): at 00:05

## 2022-05-28 RX ADMIN — APIXABAN 5 MILLIGRAM(S): 2.5 TABLET, FILM COATED ORAL at 06:14

## 2022-05-28 NOTE — DISCHARGE NOTE NURSING/CASE MANAGEMENT/SOCIAL WORK - PATIENT PORTAL LINK FT
1 You can access the FollowMyHealth Patient Portal offered by [facility] by registering at the following website: [email or web address]. By joining [facility]’s FollowMyHealth portal, you will also be able to view your health information using other applications (apps) compatible with our system.

## 2022-05-28 NOTE — DISCHARGE NOTE PROVIDER - CARE PROVIDERS DIRECT ADDRESSES
,DirectAddress_Unknown ,DirectAddress_Unknown,[email or web address] [email or web address]

## 2022-05-28 NOTE — PROGRESS NOTE ADULT - PROBLEM SELECTOR PLAN 2
cont. beta-blocker (dose decreased due to to bradycardia), A/C w/ DOAC; Pt. verbalized plan to f/u w/ her cardiologist, Dr. Garcia

## 2022-05-28 NOTE — DISCHARGE NOTE PROVIDER - NSDCMRMEDTOKEN_GEN_ALL_CORE_FT
Crestor 10 mg oral tablet: 1 tab(s) orally once a day  Eliquis 5 mg oral tablet: 1 tab(s) orally 2 times a day  metoprolol succinate 25 mg oral tablet, extended release: 1 tab(s) orally once a day   Crestor 10 mg oral tablet: 1 tab(s) orally once a day  Eliquis 5 mg oral tablet: 1 tab(s) orally 2 times a day  Metoprolol Succinate ER: 12.5 milligram(s) orally once a day

## 2022-05-28 NOTE — DISCHARGE NOTE PROVIDER - HOSPITAL COURSE
72y Female w/ PMH of Afib (on eliquis and Metoprolol), HLD on crestor P/W acute confusion/ forgetfulness since 4:30pm on 05/26. Stroke code was called upon arrival to [facility] ER with initial CTH negative for any acute intracranial pathology. Initial NIHSS 0. Non focal exam. CTA H/N deferred 2/2 low suspicion for LVO and due to national contrast shortage. Pt admitted to Stroke unit for further work up.  Problem List/Main Diagnoses (system-based):     #CVA workup  - CTH negative for CVA  - TTE indeterminate for PFO  - MRA head/neck showing____  - c/w Eliquis 5mg BID, crestor 10mg qd    #HTN  - c/w home Toprol 25mg qd    #HLD  - c/w crestor 10mg qd    Inpatient treatment course:   New medications:      72y Female w/ PMH of Afib (on eliquis and Metoprolol), HLD on crestor P/W acute confusion/ forgetfulness since 4:30pm on 05/26. Stroke code was called upon arrival to [facility] ER with initial CTH negative for any acute intracranial pathology. Initial NIHSS 0. Non focal exam. CTA H/N deferred 2/2 low suspicion for LVO and due to national contrast shortage. Pt admitted to Stroke unit for further work up.  Problem List/Main Diagnoses (system-based):     #CVA workup  - CTH negative for CVA  - TTE indeterminate for PFO  - MRA head/neck negative for recent infarct  - c/w Eliquis 5mg BID, crestor 10mg qd    #HTN  - c/w home Toprol 25mg qd    #HLD  - c/w crestor 10mg qd    New medications: no new medications.     72y Female w/ PMH of Afib (on eliquis and Metoprolol), HLD on crestor P/W acute confusion/ forgetfulness since 4:30pm on 05/26. Stroke code was called upon arrival to [facility] ER with initial CTH negative for any acute intracranial pathology. Initial NIHSS 0. Non focal exam. CTA H/N deferred 2/2 low suspicion for LVO and due to national contrast shortage. Pt admitted to Stroke unit for further work up.  Problem List/Main Diagnoses (system-based):     #CVA workup  - CTH negative for CVA  - TTE indeterminate for PFO  - MRA head/neck negative for recent infarct  - LE dopplers negative for DVT  - c/w Eliquis 5mg BID, crestor 10mg qd    #HTN  #Afib  - Given noted asymptomatic bradycardia noted in the 30s-40s, will decrease home Toprol 25mg qd dose to Toprol 12.5mg qd (per patient's conversation with PCP and cardiologist Dr. Garcia)  - c/w Eliquis 5mg BID    #HLD  - c/w crestor 10mg qd    New medications: no new medications.     The patient is a 72-year-old female who has a history of atrial fibrillation for which she is on Eliquis and metoprolol and hyperlipidemia. She was admitted with a transient episode (at least 8 hours) of confusion/disorientation/?word finding difficulties without other associated neurologic symptoms. Symptoms occurred several hours after receiving COVID booster. She had been  compliant with Eliquis. MRI brain was negative for acute ischemia and MRA head/neck were entirely unremarkable. TTE showed a possible PFO; DVT US was negative. We discussed that this a relatively common finding and unrelated to her presentation.  The patient was noted to have 2nd degree heart block on telemetry overnight ***   Overall, given duration fo symptoms and negative MRI, TIA/stroke seems less likely.  Etiology is not entirely clear. If she has recurrent episodes, EEG should be considered in the outpatient setting.     Problem List/Main Diagnoses (system-based):     #CVA workup  - CTH negative for CVA  - TTE indeterminate for PFO  - MRA head/neck negative for recent infarct  - LE dopplers negative for DVT  - c/w Eliquis 5mg BID, crestor 10mg qd    #HTN  #Afib  - Given noted asymptomatic bradycardia noted in the 30s-40s, will decrease home Toprol 25mg qd dose to Toprol 12.5mg qd (per patient's conversation with PCP and cardiologist Dr. Garcia)  - c/w Eliquis 5mg BID    #HLD  - c/w crestor 10mg qd    New medications: no new medications.     The patient is a 72-year-old female who has a history of atrial fibrillation for which she is on Eliquis and metoprolol and hyperlipidemia. She was admitted with a transient episode (at least 8 hours) of confusion/disorientation/?word finding difficulties without other associated neurologic symptoms. Symptoms occurred several hours after receiving COVID booster. She had been  compliant with Eliquis. MRI brain was negative for acute ischemia and MRA head/neck were entirely unremarkable. TTE showed a possible PFO; DVT US was negative. We discussed that this a relatively common finding and unrelated to her presentation.  The patient was noted to have 2nd degree heart block on telemetry overnight ***   Overall, given duration fo symptoms and negative MRI, TIA/stroke seems less likely.  Etiology is not entirely clear. If she has recurrent episodes, EEG should be considered in the outpatient setting.     Problem List/Main Diagnoses (system-based):     #CVA workup  - CTH negative for CVA  - TTE indeterminate for PFO  - MRA head/neck negative for recent infarct  - LE dopplers negative for DVT  - c/w Eliquis 5mg BID, crestor 10mg qd    #HTN  #Afib  - Given noted asymptomatic bradycardia with concern for second degree heart block noted on tele monitoring (HR noted in the 30s-40s), will decrease home Toprol 25mg qd dose to Toprol 12.5mg qd (per patient's conversation with PCP and cardiologist Dr. Garcia)  - per patient, she would like to follow-up with her cardiologist outpatient  - c/w Eliquis 5mg BID    #HLD  - c/w crestor 10mg qd    New medications: no new medications.

## 2022-05-28 NOTE — PROGRESS NOTE ADULT - PROBLEM SELECTOR PLAN 1
unclear etiology; sx resolved, concerning for TIA; no e/o CVA on MRI; cont. work-up and mgmt per Neuro, already takes DOAC + statin

## 2022-05-28 NOTE — DISCHARGE NOTE NURSING/CASE MANAGEMENT/SOCIAL WORK - NSDCPEFALRISK_GEN_ALL_CORE
For information on Fall & Injury Prevention, visit: [email or web address] OR  [email or web address] OR  https://www.cdc.gov/steadi/patient.html

## 2022-05-28 NOTE — DISCHARGE NOTE PROVIDER - NSDCCPCAREPLAN_GEN_ALL_CORE_FT
PRINCIPAL DISCHARGE DIAGNOSIS  Diagnosis: Acute confusion  Assessment and Plan of Treatment: You presented to the hospital due to acute confusion, for which you were admitted for workup of stroke. CT of the head did not show any acute stroke.       PRINCIPAL DISCHARGE DIAGNOSIS  Diagnosis: Acute confusion  Assessment and Plan of Treatment: You presented to the hospital due to acute confusion, for which you were admitted for workup of stroke. CT of the head did not show any acute stroke. MRI of the head also did not show any recent strokes. This acute confusion is unlikely to be from a stroke.  If you have any new episodes of confusion, please follow-up with neurology (information attached) for follow-up appointment.       PRINCIPAL DISCHARGE DIAGNOSIS  Diagnosis: Acute confusion  Assessment and Plan of Treatment: You presented to the hospital due to acute confusion, for which you were admitted for workup of stroke. CT of the head did not show any acute stroke. MRI of the head also did not show any recent strokes. This acute confusion is unlikely to be from a stroke.  If you have any new episodes of confusion, please follow-up with neurology (information attached) for follow-up appointment.      SECONDARY DISCHARGE DIAGNOSES  Diagnosis: Bradycardia  Assessment and Plan of Treatment: Your heart rate was noted to be low in the hospital, as low as the 30s-40s. You did not experience any symptoms with this low heart rate but heart monitoring while you were inpatient was concerning for second degree heart block. A repeat EKG showed no heart block but sinus bradycardia. Please only take 12.5mg (or half) of your home metoprolol pill and follow up with your cardiologist Dr. Garcia as soon as possible.  If you experience chest pain, light headedness and dizziness, shortness of breath, please discontinue your metoprolol and contact Dr. Garcia immediately or visit your nearest emergency room.

## 2022-05-28 NOTE — PROGRESS NOTE ADULT - TIME BILLING
review of patient information including recent vital signs, labs, imaging, and notes; assessing, examining patient; updating patient/family; discussion and coordination of care with multidisciplinary team.
d/w Stroke resident and attending  will follow w/ you

## 2022-05-28 NOTE — DISCHARGE NOTE PROVIDER - CARE PROVIDER_API CALL
Rgeine Dixon)  Neurology  [address] [address]  Phone: [phone]  Fax: [phone]  Follow Up Time:    Regine Dixon)  Neurology  [address] [address]  Phone: [phone]  Fax: [phone]  Follow Up Time:     Vijay Garcia  CARDIOVASCULAR DISEASE  [address] [place] [address]  Phone: [phone]  Fax: [phone]  Follow Up Time:

## 2022-05-28 NOTE — PROGRESS NOTE ADULT - SUBJECTIVE AND OBJECTIVE BOX
Patient is a 72y old  Female who presents with a chief complaint of AMS/Acute Confusion (28 May 2022 09:27)      INTERVAL HPI/OVERNIGHT EVENTS:    Pt. seen and examined earlier today  Pt. feels well; she denies F/C, CP, SOB, palpitations, lightheadedness, sweating    Review of Systems: 12 point review of systems otherwise negative    MEDICATIONS  (STANDING):  apixaban 5 milliGRAM(s) Oral every 12 hours  influenza  Vaccine (HIGH DOSE) 0.7 milliLiter(s) IntraMuscular once  rosuvastatin 10 milliGRAM(s) Oral at bedtime    MEDICATIONS  (PRN):  acetaminophen     Tablet .. 650 milliGRAM(s) Oral every 6 hours PRN Temp greater or equal to 38C (100.4F)  acetaminophen     Tablet .. 325 milliGRAM(s) Oral every 6 hours PRN Mild Pain (1 - 3), Moderate Pain (4 - 6), Severe Pain (7 - 10)      Allergies    sulfa drugs (Hives)    Intolerances          Vital Signs Last 24 Hrs  T(C): 36.4 (28 May 2022 13:30), Max: 37.7 (27 May 2022 22:35)  T(F): 97.5 (28 May 2022 13:30), Max: 99.8 (27 May 2022 22:35)  HR: 58 (28 May 2022 12:00) (54 - 80)  BP: 142/66 (28 May 2022 12:00) (121/56 - 166/71)  BP(mean): 95 (28 May 2022 12:00) (81 - 102)  RR: 18 (28 May 2022 12:00) (17 - 20)  SpO2: 98% (28 May 2022 12:00) (94% - 100%)  CAPILLARY BLOOD GLUCOSE            Physical Exam:  (earlier today)  Daily     General:  well-appearing in NAD, sitting in a chair  HEENT:  MMM  CV:  RRR  Lungs:  CTA B/L  Abdomen:  soft NT ND  Extremities:  no edema B/L LE  Skin:  WWP  Neuro:  AAOx3, no dysarthria or word-finding difficulty    LABS:                        13.2   9.15  )-----------( 202      ( 27 May 2022 05:51 )             40.1     05-27    141  |  103  |  13  ----------------------------<  102<H>  3.9   |  26  |  0.79    Ca    9.9      27 May 2022 05:51  Phos  2.9     05-27  Mg     2.1     05-27    TPro  7.3  /  Alb  4.4  /  TBili  0.3  /  DBili  x   /  AST  34  /  ALT  32  /  AlkPhos  85  05-26    PT/INR - ( 26 May 2022 19:22 )   PT: 14.3 sec;   INR: 1.20          PTT - ( 26 May 2022 19:22 )  PTT:39.5 sec  Urinalysis Basic - ( 26 May 2022 20:25 )    Color: Yellow / Appearance: Clear / SG: <=1.005 / pH: x  Gluc: x / Ketone: NEGATIVE  / Bili: Negative / Urobili: 0.2 E.U./dL   Blood: x / Protein: NEGATIVE mg/dL / Nitrite: NEGATIVE   Leuk Esterase: Small / RBC: < 5 /HPF / WBC 5-10 /HPF   Sq Epi: x / Non Sq Epi: x / Bacteria: Present /HPF

## 2022-05-28 NOTE — DISCHARGE NOTE PROVIDER - PROVIDER TOKENS
PROVIDER:[TOKEN:[[id number]]] PROVIDER:[TOKEN:[[id number]]],PROVIDER:[TOKEN:[[id number]]]

## 2022-06-01 PROBLEM — I48.91 UNSPECIFIED ATRIAL FIBRILLATION: Chronic | Status: ACTIVE | Noted: 2022-05-26

## 2022-06-01 PROBLEM — E78.5 HYPERLIPIDEMIA, UNSPECIFIED: Chronic | Status: ACTIVE | Noted: 2022-05-26

## 2022-06-02 ENCOUNTER — APPOINTMENT (OUTPATIENT)
Dept: HEART AND VASCULAR | Facility: CLINIC | Age: 72
End: 2022-06-02
Payer: MEDICARE

## 2022-06-02 VITALS
HEIGHT: 65 IN | DIASTOLIC BLOOD PRESSURE: 82 MMHG | SYSTOLIC BLOOD PRESSURE: 122 MMHG | WEIGHT: 145 LBS | OXYGEN SATURATION: 98 % | HEART RATE: 95 BPM | TEMPERATURE: 97.3 F | BODY MASS INDEX: 24.16 KG/M2

## 2022-06-02 DIAGNOSIS — I48.0 PAROXYSMAL ATRIAL FIBRILLATION: ICD-10-CM

## 2022-06-02 PROCEDURE — 99213 OFFICE O/P EST LOW 20 MIN: CPT

## 2022-06-02 NOTE — HISTORY OF PRESENT ILLNESS
[FreeTextEntry1] : f/u PAF\par was in hospt after possible TIA but tests neg\par had cognitive issues after covid booster shot\par no syncope\par no cp or dyspnea\par tolerating doac, no bleeding\par

## 2022-06-03 DIAGNOSIS — R41.82 ALTERED MENTAL STATUS, UNSPECIFIED: ICD-10-CM

## 2022-06-03 DIAGNOSIS — I44.1 ATRIOVENTRICULAR BLOCK, SECOND DEGREE: ICD-10-CM

## 2022-06-03 DIAGNOSIS — Z88.2 ALLERGY STATUS TO SULFONAMIDES: ICD-10-CM

## 2022-06-03 DIAGNOSIS — Z79.01 LONG TERM (CURRENT) USE OF ANTICOAGULANTS: ICD-10-CM

## 2022-06-03 DIAGNOSIS — Z23 ENCOUNTER FOR IMMUNIZATION: ICD-10-CM

## 2022-06-03 DIAGNOSIS — I48.0 PAROXYSMAL ATRIAL FIBRILLATION: ICD-10-CM

## 2022-06-03 DIAGNOSIS — I10 ESSENTIAL (PRIMARY) HYPERTENSION: ICD-10-CM

## 2022-06-03 DIAGNOSIS — E78.5 HYPERLIPIDEMIA, UNSPECIFIED: ICD-10-CM

## 2022-06-03 DIAGNOSIS — R00.1 BRADYCARDIA, UNSPECIFIED: ICD-10-CM

## 2022-06-03 DIAGNOSIS — Z20.822 CONTACT WITH AND (SUSPECTED) EXPOSURE TO COVID-19: ICD-10-CM
